# Patient Record
Sex: FEMALE | Race: ASIAN | URBAN - METROPOLITAN AREA
[De-identification: names, ages, dates, MRNs, and addresses within clinical notes are randomized per-mention and may not be internally consistent; named-entity substitution may affect disease eponyms.]

---

## 2017-01-06 ENCOUNTER — OFFICE VISIT (OUTPATIENT)
Dept: OBGYN | Facility: CLINIC | Age: 32
End: 2017-01-06
Attending: ADVANCED PRACTICE MIDWIFE
Payer: COMMERCIAL

## 2017-01-06 VITALS
BODY MASS INDEX: 25.56 KG/M2 | DIASTOLIC BLOOD PRESSURE: 67 MMHG | SYSTOLIC BLOOD PRESSURE: 112 MMHG | HEIGHT: 64 IN | WEIGHT: 149.7 LBS

## 2017-01-06 DIAGNOSIS — M79.89 LEFT AXILLARY SWELLING: ICD-10-CM

## 2017-01-06 DIAGNOSIS — O26.03 EXCESSIVE WEIGHT GAIN IN PREGNANCY IN THIRD TRIMESTER: ICD-10-CM

## 2017-01-06 DIAGNOSIS — O99.613 CONSTIPATION DURING PREGNANCY IN THIRD TRIMESTER: Primary | ICD-10-CM

## 2017-01-06 DIAGNOSIS — K59.00 CONSTIPATION DURING PREGNANCY IN THIRD TRIMESTER: Primary | ICD-10-CM

## 2017-01-06 PROCEDURE — 99211 OFF/OP EST MAY X REQ PHY/QHP: CPT | Mod: ZF

## 2017-01-06 RX ORDER — ASPIRIN 81 MG
100 TABLET, DELAYED RELEASE (ENTERIC COATED) ORAL 2 TIMES DAILY
Qty: 100 TABLET | Refills: 1 | Status: ON HOLD | OUTPATIENT
Start: 2017-01-06 | End: 2017-01-17

## 2017-01-06 RX ORDER — ASPIRIN 81 MG
100 TABLET, DELAYED RELEASE (ENTERIC COATED) ORAL DAILY
Qty: 60 TABLET | Refills: 1 | Status: SHIPPED | OUTPATIENT
Start: 2017-01-06 | End: 2017-01-06

## 2017-01-06 ASSESSMENT — PAIN SCALES - GENERAL: PAINLEVEL: NO PAIN (0)

## 2017-01-06 NOTE — NURSING NOTE
Chief Complaint   Patient presents with     Prenatal Care   34.5 weeks ob visit  Some leg cramps  occ  Contractions.

## 2017-01-06 NOTE — PROGRESS NOTES
"Subjective:      31 year old  at 34w5d presents for a routine prenatal appointment with FOB and .         No vaginal bleeding,  leakage of fluid, or change in vaginal discharge.  No contractions.  + fetal movement.     No HA, visual changes, RUQ or epigastric pain.   Patient concerns: Feeling well overall.     - Breastpump questions - unsure about which brand to get.    - Now having BL carpal tunnel tingling - resolves after a few minutes. Has not tried splints as recommended.  - Having constipation - eating a banana a day, drinking  1-2 cups of milk a day. Has tried to limit rice and wheat.   Not taking stool softener.  Having some pain with stools when tough - occasional rectal bleeding.  Not eating much dietary fiber.  Hasn't tried metamucil or miralax.    - L axillary feels much softer to pt, NT, no redness.   - Has some leg cramps at times.  Worried about calcium.     Objective:  Filed Vitals:    17 1110   BP: 112/67   Height: 1.613 m (5' 3.5\")   Weight: 67.903 kg (149 lb 11.2 oz)    See OB flowsheet    Extremeties -  Left Axilla NT, no redness or swelling.   Left Axilla - no redness or swelling.     Assessment/Plan     Encounter Diagnoses   Name Primary?     Constipation during pregnancy in third trimester Yes     Left axillary swelling      Excessive weight gain in pregnancy in third trimester      No orders of the defined types were placed in this encounter.     Orders Placed This Encounter   Medications     DISCONTD: docusate sodium (COLACE) 100 MG tablet     Sig: Take 100 mg by mouth daily     Dispense:  60 tablet     Refill:  1     docusate sodium (COLACE) 100 MG tablet     Sig: Take 100 mg by mouth 2 times daily     Dispense:  100 tablet     Refill:  1       PHQ-9 SCORE 2016   Total Score 0 4     PHQ-9 SCORE 2016   Total Score 0 4       - Will contact insurance directly if [facility] pharmacy unable to help fufill breastpump RX.    - Again declined " RX for wrist splints.  Encouraged to get OTC and use BL at night every night and during day prn.   -  Reivewed dietary changes - increase PO water, avoid constipating foods, increase dietary fiber/metamucille(sugar free), daily exercise.  Colace RX BID given. Miralax prn reviewed.   - Reviewed calf stretches before bed, increase water, warning s/s and how/why to contact provider prn.   - Labor signs discussed. Reinforced daily fetal movement counts.  - Reviewed why/how to contact provider if headache/visual changes/RUQ or epigastric pain, decreased fetal movement, vaginal bleeding, leakage of fluid.  - Return to clinic in 1 week and prn if questions or concerns.     KILLIAN Ochoa CNM

## 2017-01-06 NOTE — Clinical Note
"2017       RE: Susanna Gleason  [address] [address]     Dear Colleague,    Thank you for referring your patient, Susanna Gleason, to the [facility] at [facility]. Please see a copy of my visit note below.    Subjective:      31 year old  at 34w5d presents for a routine prenatal appointment with FOB and .         No vaginal bleeding,  leakage of fluid, or change in vaginal discharge.  No contractions.  + fetal movement.     No HA, visual changes, RUQ or epigastric pain.   Patient concerns: Feeling well overall.     - Breastpump questions - unsure about which brand to get.    - Now having BL carpal tunnel tingling - resolves after a few minutes. Has not tried splints as recommended.  - Having constipation - eating a banana a day, drinking  1-2 cups of milk a day. Has tried to limit rice and wheat.   Not taking stool softener.  Having some pain with stools when tough - occasional rectal bleeding.  Not eating much dietary fiber.  Hasn't tried metamucil or miralax.    - L axillary feels much softer to pt, NT, no redness.   - Has some leg cramps at times.  Worried about calcium.     Objective:  Filed Vitals:    17 1110   BP: 112/67   Height: 1.613 m (5' 3.5\")   Weight: 67.903 kg (149 lb 11.2 oz)    See OB flowsheet    Extremeties -  Left Axilla NT, no redness or swelling.   Left Axilla - no redness or swelling.     Assessment/Plan     Encounter Diagnoses   Name Primary?     Constipation during pregnancy in third trimester Yes     Left axillary swelling      Excessive weight gain in pregnancy in third trimester      No orders of the defined types were placed in this encounter.     Orders Placed This Encounter   Medications     DISCONTD: docusate sodium (COLACE) 100 MG tablet     Sig: Take 100 mg by mouth daily     Dispense:  60 tablet     Refill:  1     docusate sodium (COLACE) 100 MG tablet     Sig: Take 100 mg by mouth 2 times daily "     Dispense:  100 tablet     Refill:  1       PHQ-9 SCORE 8/4/2016 11/2/2016   Total Score 0 4     PHQ-9 SCORE 8/4/2016 11/2/2016   Total Score 0 4       - Will contact insurance directly if [facility] pharmacy unable to help fufill breastpump RX.    - Again declined RX for wrist splints.  Encouraged to get OTC and use BL at night every night and during day prn.   -  Reivewed dietary changes - increase PO water, avoid constipating foods, increase dietary fiber/metamucille(sugar free), daily exercise.  Colace RX BID given. Miralax prn reviewed.   - Reviewed calf stretches before bed, increase water, warning s/s and how/why to contact provider prn.   - Labor signs discussed. Reinforced daily fetal movement counts.  - Reviewed why/how to contact provider if headache/visual changes/RUQ or epigastric pain, decreased fetal movement, vaginal bleeding, leakage of fluid.  - Return to clinic in 1 week and prn if questions or concerns.     KILLIAN Ochoa CNM

## 2017-01-09 PROBLEM — K59.00 CONSTIPATION DURING PREGNANCY IN THIRD TRIMESTER: Status: ACTIVE | Noted: 2017-01-09

## 2017-01-09 PROBLEM — O99.613 CONSTIPATION DURING PREGNANCY IN THIRD TRIMESTER: Status: ACTIVE | Noted: 2017-01-09

## 2017-01-17 ENCOUNTER — HOSPITAL ENCOUNTER (OUTPATIENT)
Facility: CLINIC | Age: 32
End: 2017-01-17
Attending: ADVANCED PRACTICE MIDWIFE | Admitting: ADVANCED PRACTICE MIDWIFE
Payer: COMMERCIAL

## 2017-01-17 ENCOUNTER — HOSPITAL ENCOUNTER (OUTPATIENT)
Facility: CLINIC | Age: 32
Discharge: HOME | End: 2017-01-17
Attending: ADVANCED PRACTICE MIDWIFE | Admitting: ADVANCED PRACTICE MIDWIFE
Payer: COMMERCIAL

## 2017-01-17 ENCOUNTER — TRANSFERRED RECORDS (OUTPATIENT)
Dept: OBGYN | Facility: CLINIC | Age: 32
End: 2017-01-17

## 2017-01-17 ENCOUNTER — OFFICE VISIT (OUTPATIENT)
Dept: INTERPRETER SERVICES | Facility: CLINIC | Age: 32
End: 2017-01-17

## 2017-01-17 ENCOUNTER — TELEPHONE (OUTPATIENT)
Dept: OBGYN | Facility: CLINIC | Age: 32
End: 2017-01-17

## 2017-01-17 VITALS
BODY MASS INDEX: 25.61 KG/M2 | DIASTOLIC BLOOD PRESSURE: 67 MMHG | TEMPERATURE: 98.4 F | SYSTOLIC BLOOD PRESSURE: 108 MMHG | HEIGHT: 64 IN | WEIGHT: 150 LBS
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
## 2017-01-17 LAB
ALBUMIN UR-MCNC: NEGATIVE MG/DL
APPEARANCE UR: CLEAR
APTT PPP: 30 SEC (ref 22–37)
BACTERIA #/AREA URNS HPF: ABNORMAL /HPF
BASOPHILS # BLD AUTO: 0 10E9/L (ref 0–0.2)
BASOPHILS NFR BLD AUTO: 0 %
BILIRUB UR QL STRIP: NEGATIVE
COLOR UR AUTO: ABNORMAL
DIFFERENTIAL METHOD BLD: ABNORMAL
EOSINOPHIL # BLD AUTO: 0 10E9/L (ref 0–0.7)
EOSINOPHIL NFR BLD AUTO: 0.1 %
ERYTHROCYTE [DISTWIDTH] IN BLOOD BY AUTOMATED COUNT: 13.7 % (ref 10–15)
FETAL HGB STAIN: NORMAL
FIBRINOGEN PPP-MCNC: 408 MG/DL (ref 200–420)
GLUCOSE UR STRIP-MCNC: 30 MG/DL
HCT VFR BLD AUTO: 35.4 % (ref 35–47)
HGB BLD-MCNC: 11.9 G/DL (ref 11.7–15.7)
HGB F BLD QL KLEIH BETKE: NORMAL
HGB UR QL STRIP: NEGATIVE
IMM GRANULOCYTES # BLD: 0.1 10E9/L (ref 0–0.4)
IMM GRANULOCYTES NFR BLD: 1 %
INR PPP: 0.96 (ref 0.86–1.14)
KETONES UR STRIP-MCNC: NEGATIVE MG/DL
LEUKOCYTE ESTERASE UR QL STRIP: ABNORMAL
LYMPHOCYTES # BLD AUTO: 0.9 10E9/L (ref 0.8–5.3)
LYMPHOCYTES NFR BLD AUTO: 10.1 %
MCH RBC QN AUTO: 35 PG (ref 26.5–33)
MCHC RBC AUTO-ENTMCNC: 33.6 G/DL (ref 31.5–36.5)
MCV RBC AUTO: 104 FL (ref 78–100)
MICRO REPORT STATUS: NORMAL
MONOCYTES # BLD AUTO: 0.4 10E9/L (ref 0–1.3)
MONOCYTES NFR BLD AUTO: 4.5 %
NEUTROPHILS # BLD AUTO: 7.7 10E9/L (ref 1.6–8.3)
NEUTROPHILS NFR BLD AUTO: 84.3 %
NITRATE UR QL: NEGATIVE
NRBC # BLD AUTO: 0 10*3/UL
NRBC BLD AUTO-RTO: 0 /100
PH UR STRIP: 6 PH (ref 5–7)
PLATELET # BLD AUTO: 182 10E9/L (ref 150–450)
RBC # BLD AUTO: 3.4 10E12/L (ref 3.8–5.2)
RBC #/AREA URNS AUTO: <1 /HPF (ref 0–2)
SP GR UR STRIP: 1.01 (ref 1–1.03)
SPECIMEN SOURCE: NORMAL
SQUAMOUS #/AREA URNS AUTO: 2 /HPF (ref 0–1)
URN SPEC COLLECT METH UR: ABNORMAL
UROBILINOGEN UR STRIP-MCNC: NORMAL MG/DL (ref 0–2)
WBC # BLD AUTO: 9.1 10E9/L (ref 4–11)
WBC #/AREA URNS AUTO: 2 /HPF (ref 0–2)
WET PREP SPEC: NORMAL

## 2017-01-17 PROCEDURE — 59025 FETAL NON-STRESS TEST: CPT

## 2017-01-17 PROCEDURE — 87653 STREP B DNA AMP PROBE: CPT | Performed by: ADVANCED PRACTICE MIDWIFE

## 2017-01-17 PROCEDURE — 99214 OFFICE O/P EST MOD 30 MIN: CPT | Mod: 25

## 2017-01-17 PROCEDURE — 85025 COMPLETE CBC W/AUTO DIFF WBC: CPT | Performed by: ADVANCED PRACTICE MIDWIFE

## 2017-01-17 PROCEDURE — T1013 SIGN LANG/ORAL INTERPRETER: HCPCS | Mod: U3

## 2017-01-17 PROCEDURE — 36415 COLL VENOUS BLD VENIPUNCTURE: CPT | Performed by: ADVANCED PRACTICE MIDWIFE

## 2017-01-17 PROCEDURE — 85460 HEMOGLOBIN FETAL: CPT | Performed by: ADVANCED PRACTICE MIDWIFE

## 2017-01-17 PROCEDURE — 81001 URINALYSIS AUTO W/SCOPE: CPT | Performed by: ADVANCED PRACTICE MIDWIFE

## 2017-01-17 PROCEDURE — 85384 FIBRINOGEN ACTIVITY: CPT | Performed by: ADVANCED PRACTICE MIDWIFE

## 2017-01-17 PROCEDURE — 87210 SMEAR WET MOUNT SALINE/INK: CPT | Performed by: ADVANCED PRACTICE MIDWIFE

## 2017-01-17 PROCEDURE — 85730 THROMBOPLASTIN TIME PARTIAL: CPT | Performed by: ADVANCED PRACTICE MIDWIFE

## 2017-01-17 PROCEDURE — 85610 PROTHROMBIN TIME: CPT | Performed by: ADVANCED PRACTICE MIDWIFE

## 2017-01-17 RX ORDER — ACETAMINOPHEN 325 MG/1
650-975 TABLET ORAL EVERY 4 HOURS PRN
Status: DISCONTINUED | OUTPATIENT
Start: 2017-01-17 | End: 2017-01-17 | Stop reason: HOSPADM

## 2017-01-17 NOTE — IP AVS SNAPSHOT
[id number]    [address] [place] [address]    Phone:  [phone]                                       After Visit Summary   1/17/2017    Susanna Gleason    MRN: [id number]           After Visit Summary Signature Page     I have received my discharge instructions, and my questions have been answered. I have discussed any challenges I see with this plan with the nurse or doctor.    ..........................................................................................................................................  Patient/Patient Representative Signature      ..........................................................................................................................................  Patient Representative Print Name and Relationship to Patient    ..................................................               ................................................  Date                                            Time    ..........................................................................................................................................  Reviewed by Signature/Title    ...................................................              ..............................................  Date                                                            Time

## 2017-01-17 NOTE — PROGRESS NOTES
D/C teaching done with pt. And spouse with .  All questions and concerns addressed, they verbalize understanding.

## 2017-01-17 NOTE — TELEPHONE ENCOUNTER
Received call from Susanna stating last evening she fell on her back but felt fine afterwards. This morning she has sharp pain in her low abdomen that is constant. Denies bleeding and leaking fluid. Positive fetal movement. She sounded distressed on the phone.    [facility] and she agreed with plan.    Called [facility] and gave above information to Toshia. Pt sees the midwives.

## 2017-01-17 NOTE — PLAN OF CARE
Pt. Presents to L&D for contactions that started at 0730 this morning.  She stats they feel like sharp cramping.  She also states that she fell last night going to do laundry and walking outside.  She slipped and fell onto her left hip then her back.  She denies bruising or pain.  She denies LOF or bleeding and + FM since the fall/  [place] and US placed.  Dacia Membreno CNM at bedside to consult with pt.

## 2017-01-17 NOTE — DISCHARGE INSTRUCTIONS
Discharge Instruction for Undelivered Patients      You were seen for: a fall and labor assessment  We Consulted: Dacia PHILLIPS CNM  You had (Test or Medicine):fetal and uterine monitoring, urine and blood test, GBS test     Diet:   Drink 8 to 12 glasses of liquids (milk, juice, water) every day.  You may eat meals and snacks.     Activity:  Count fetal kicks everyday (see handout)  Call your doctor or nurse midwife if your baby is moving less than usual.     Call your provider if you notice:  Swelling in your face or increased swelling in your hands or legs.  Headaches that are not relieved by Tylenol (acetaminophen).  Changes in your vision (blurring: seeing spots or stars.)  Nausea (sick to your stomach) and vomiting (throwing up).   Weight gain of 5 pounds or more per week.  Heartburn that doesn't go away.  Signs of bladder infection: pain when you urinate (use the toilet), need to go more often and more urgently.  The bag of olivia (rupture of membranes) breaks, or you notice leaking in your underwear.  Bright red blood in your underwear.  Abdominal (lower belly) or stomach pain.  For first baby: Contractions (tightening) less than 5 minutes apart for one hour or more.  Increase or change in vaginal discharge (note the color and amount)  Other: drink 3 pitchers of water daily.  Sen[place]ot-S 1pill once or twice daily for constipation    Follow-up:  As scheduled in the clinic Friday Jan. 20, 2017

## 2017-01-17 NOTE — PROGRESS NOTES
"HOSPITAL TRIAGE NOTE  ===================    CHIEF COMPLAINT  ========================  Susanna Gleason is a 31 year old patient presenting today at 36w2d for evaluation s/p fall, irregular contractions.    Patient's last menstrual period was 2016 (exact date).  Estimated Date of Delivery: 2017     HPI  ==================   Pt is a 31 y.o.  @ 36+2 by LMP, confirmed by 1st trimester u/s. Presented to triage reporting that she fell last night at 2030. Patient states she was walking outside to the laundry room and slipped on the ice. She states she fell on her left hip and her back; did not hit her abdomen. Reports that she felt \"fine\" after falling and had no pain or concerns. She did not notify the clinic or BP. She states she did not plan to come in for evaluation, however, upon waking up this morning, she started to feel irregular lower abdominal cramping. Describes as similar to menstrual cramping. States that initially it was constant and painful. Now reports that she has only felt this cramping 1-2 times. Denies vaginal bleeding, discharge or loss of fluid. Reports +fetal movement.     Prenatal record and labs reviewed from [facility], through [employer] EMR.  CONTRACTIONS: irregular, cramping  ABDOMINAL PAIN: none  FETAL MOVEMENT: active    VAGINAL BLEEDING: none  RUPTURE OF MEMBRANES: no  PELVIC PAIN: none    PREGNANCY COMPLICATIONS: none  OTHER: PMH significant for migraines, perioral dermatitis and bilateral carpal tunnel in pregnancy    REVIEW OF SYSTEMS  =====================  C: NEGATIVE for fever, chills  I: NEGATIVE for worrisome rashes, moles or lesions  E: NEGATIVE for vision changes or irritation  R: NEGATIVE for significant cough or SOB  CV: NEGATIVE for chest pain, palpitations or varicosities  GI: NEGATIVE for nausea, abdominal pain, heartburn, or change in bowel habits  : NEGATIVE for frequency, dysuria, or hematuria  M: NEGATIVE for significant arthralgias or " myalgia  N: NEGATIVE for headache, weakness, dizziness or paresthesias  P: NEGATIVE for changes in mood or affect    PROBLEM LIST  ===============  Patient Active Problem List    Diagnosis Date Noted     Fall 01/17/2017     Priority: Medium     Constipation during pregnancy in third trimester 01/09/2017     Priority: Medium     1/6/17 - Reivewed dietary changes - increase PO water, avoid constipating foods, increase dietary fiber/metamucille(sugar free), daily exercise.  Colace RX BID given. Miralax prn reviewed.        Carpal tunnel syndrome during pregnancy 12/20/2016     Priority: Medium     12/20/2016 - Declined RX, will get OTC wrist splint.       Excessive weight gain in pregnancy in third trimester 12/20/2016     Priority: Medium     1/9/2017 - No food journal to review.  Is eating bananas daily, milk daily.  Encouraged again to review carbohydrate intake, increase water, increase physical activity.   12/20/2016 - Reviewed importance of balanced, protein rich diet in pregnancy. Reviewed at length avoidance of simple carbohydrates in pregnancy (limit fruit, milk, pasta, etc). Encouraged food journal, bring to next visit to [place]. Pt may send via my chart before next visit for earlier review         Left axillary swelling 12/20/2016     Priority: Medium     1/6/17 - Swelling no longer present.  NT, no redness or mass/lump/bump.  Continue surveillance.  12/20/2016 - L lymph node noted swollen but NT, no s/s of infection. Possible swelling of breast tissue.   RTO in 2 weeks and prn if symptoms worsen. Consider US prn.          Perioral dermatitis 10/26/2016     Priority: Medium     Class: Acute     Migraine without aura 09/02/2016     Priority: Medium     Class: Chronic     9/1/16: Reports history of migraine headaches prior to pregnancy, approximately 4-5x/year; Reoccurring since pregnancy, not currently taking medication   To schedule appointment with Dr. Sumner _____       Vitamin D deficiency 07/13/2016      Priority: Medium     Taking supplementation       , CNM Pt, STEPHANI  2016     Priority: Medium     Class: Acute     2016 - EOB done with .  Declined water birth.  EOB for next appt as had more questions.   : 9wk1 day, only sm subchorionic hemorrhage  16- US- 7 6/7wks, moderate subchorionic hemorrhage.             HISTORIES  ==============  ALLERGIES:    No Known Allergies  PAST MEDICAL HISTORY  Past Medical History   Diagnosis Date     NO ACTIVE PROBLEMS      SOCIAL HISTORY  Social History     Social History     Marital Status:      Spouse Name: Nevaeh Benitez     Number of Children: N/A     Years of Education: N/A     Occupational History     stay at home       post-doc, biomed engineering     Social History Main Topics     Smoking status: Never Smoker      Smokeless tobacco: Not on file     Alcohol Use: No     Drug Use: No     Sexual Activity: Not on file     Other Topics Concern     Not on file     Social History Narrative    How much exercise per week? walks    How much calcium per day? diet       How much caffeine per day? none    How much vitamin D per day? prenatal    Do you/your family wear seatbelts?  Yes    Do you/your family use safety helmets? No    Do you/your family use sunscreen? Yes    Do you/your family keep firearms in the home? No    Do you/your family have a smoke detector(s)? Yes        Do you feel safe in your home? Yes    Has anyone ever touched you in an unwanted manner? No     Katelyn Deleon-Tilley, CMA 16         PARTNER:  supportive at bedside    FAMILY HISTORY  Family History   Problem Relation Age of Onset     Family History Negative       Hypertension Maternal Grandmother 60     OB HISTORY  Obstetric History       T0      TAB0   SAB0   E0   M0   L0       # Outcome Date GA Lbr [place]/2nd Weight Sex Delivery Anes PTL Lv   1 Current                 Prenatal Labs:   Lab Results   Component Value Date    ABO O  "2016    RH  Pos 2016    AS Neg 2016    HEPBANG Nonreactive 2016    TREPAB Negative 2016    HGB 11.9 2017     Rubella- Immune    EXAM  ============  /67 mmHg  Temp(Src) 98.4  F (36.9  C) (Oral)  Ht 1.613 m (5' 3.5\")  Wt 68.04 kg (150 lb)  BMI 26.15 kg/m2  LMP 2016 (Exact Date)  GENERAL APPEARANCE: healthy, alert and no distress  RESP: lungs clear to auscultation - no rales, rhonchi or wheezes  BREAST: normal without masses, tenderness or nipple discharge and no palpable axillary masses or adenopathy  CV: regular rates and rhythm, normal S1 S2, no S3 or S4 and no murmur,and no varicosities  ABDOMEN:  soft, nontender, no epigastric pain  SKIN: no suspicious lesions or rashes  NEURO: Denies headache, blurred vision, other vision changes  PSYCH: mentation appears normal. and affect normal/bright  MS/ LEGS: Reflexes normal bilaterally    CONTRACTIONS: Irritability noted as well as irregular contractions q10-20 minutes; most not felt by patient, non palpable  FETAL HEART TONES: continuous EFM x 4 hours- baseline 140 with moderate variability and positive accelerations. No decelerations.  NST: REACTIVE      PELVIC EXAM: Initial exam at 1200: Manual exam: Closed/ 20%/ -3, posterior/firm    Repeat exam at 1430: Manual exam: Unchanged, closed/20%/-3, posterior/firm  PRESENTATION: VERTEX confirmed by sve, leopolds and bedside ultrasound  SSE: No bleeding noted, white discharge noted (wet prep sent), Cervix visually appears closed  BLOOD: no  DISCHARGE: white, thick    ROM: no  AMNISURE: not done    LABS: UA, Wet prep, GBS collected   Abruption labs ordered: Abo/Rh T&S, CBC with platelets, INR, PT, Fibrinogen, Kleihauer betke  Lab results reviewed- All labs WNL, GBS pending, Kleihauer betke pending    DIAGNOSIS  ============  36w2d, evaluation s/p fall, r/o  labor  Abruption labs WNL, KB pending  No cervical change in >2 hours  Exam WNL  Category One fetal tracing x 4 " hours  NST: REACTIVE      PLAN  ============  Reviewed reassuring fetal tracing, abruption labs and exam with patient and FOB.  Emphasized  labor precautions, fetal movement counts and s/s to call/present to triage, including vaginal bleeding, discharge, lof, pain, contractions or decreased in fetal movement.  Pauly Alcantar pending, will follow up with patient re: results as indicated.  GBS pending.   Continue scheduled prenatal care, Next appt scheduled for 1/10/17.  Pt and  verbalized understanding and agreed with plan of care.  Discharged to home in stable condition.        KILLIAN Thomas CNM     Fetal Non-Stress Test Results    NST Ordered By: KILLIAN Thomas CNM       NST Start & Stop Times  NST Start Time: 1000  NST Stop Time: 1020       NST Results  Fetus A   Baseline Rate:140  Accelerations: Present  Decelerations: None  Interpretation: reactive

## 2017-01-17 NOTE — IP AVS SNAPSHOT
MRN:[id number]                      After Visit Summary   1/17/2017    Susanna Gleason    MRN: [id number]           Thank you!     Thank you for choosing [facility] for your care. Our goal is always to provide you with excellent care. Hearing back from our patients is one way we can continue to improve our services. Please take a few minutes to complete the written survey that you may receive in the mail after you visit with us. Thank you!        Patient Information     Date Of Birth          1985        About your hospital stay     You were admitted on:  January 17, 2017 You last received care in the:  [facility]    You were discharged on:  January 17, 2017       Who to Call     For medical emergencies, please call 911.  For non-urgent questions about your medical care, please call your primary care provider or clinic, None          Attending Provider     Provider    Rocío Ortiz CNM       Primary Care Provider    Physician No Ref-Primary       No address on file        Your next 10 appointments already scheduled     Jan 20, 2017 10:30 AM   RETURN OB with KILLIAN Rainey CNM, Abrahan Richards   [facility] ([facility])    [facility] [address]  [address] [facility] [id number] [phone]              Further instructions from your care team       Discharge Instruction for Undelivered Patients      You were seen for: a fall and labor assessment  We Consulted: Dacia PHILLIPS CNM  You had (Test or Medicine):fetal and uterine monitoring, urine and blood test, GBS test     Diet:   Drink 8 to 12 glasses of liquids (milk, juice, water) every day.  You may eat meals and snacks.     Activity:  Count fetal kicks everyday (see handout)  Call your doctor or nurse midwife if your baby is moving less than usual.     Call your provider if you notice:  Swelling in your face or increased swelling in your hands or legs.  Headaches  "that are not relieved by Tylenol (acetaminophen).  Changes in your vision (blurring: seeing spots or stars.)  Nausea (sick to your stomach) and vomiting (throwing up).   Weight gain of 5 pounds or more per week.  Heartburn that doesn't go away.  Signs of bladder infection: pain when you urinate (use the toilet), need to go more often and more urgently.  The bag of olivia (rupture of membranes) breaks, or you notice leaking in your underwear.  Bright red blood in your underwear.  Abdominal (lower belly) or stomach pain.  For first baby: Contractions (tightening) less than 5 minutes apart for one hour or more.  Increase or change in vaginal discharge (note the color and amount)  Other: drink 3 pitchers of water daily.  Sennekot-S 1pill once or twice daily for constipation    Follow-up:  As scheduled in the clinic Friday Jan. 20, 2017          Pending Results     Date and Time Order Name Status Description    1/17/2017 1015 Fetal hemoglobin stain In process     1/17/2017 1001 Group B strep PCR In process             Admission Information        Provider Department Dept Phone    1/17/2017 Rocío Ortiz CNM [facility] [phone]      Your Vitals Were     Blood Pressure Temperature    108/67 mmHg 98.4  F (36.9  C) (Oral)    Height Weight    1.613 m (5' 3.5\") 68.04 kg (150 lb)    BMI (Body Mass Index) Last Period    26.15 kg/m2 05/08/2016 (Exact Date)      [facility] Information     [facility] gives you secure access to your electronic health record. If you see a primary care provider, you can also send messages to your care team and make appointments. If you have questions, please call your primary care clinic.  If you do not have a primary care provider, please call [phone] and they will assist you.        Care EveryWhere ID     This is your Care EveryWhere ID. This could be used by other organizations to access your [facility] medical records  [id number]           Review of your medicines      UNREVIEWED medicines. " Ask your doctor about these medicines        Dose / Directions    prenatal multivitamin  plus iron 27-0.8 MG Tabs per tablet        Dose:  1 tablet   Take 1 tablet by mouth daily   Refills:  0       vitamin D 2000 UNITS tablet   Used for:  Vitamin D deficiency, Encounter for supervision of normal first pregnancy in second trimester        Dose:  2 tablet   Take 2 tablets by mouth daily Take two tablets daily.   Quantity:  180 tablet   Refills:  3         CONTINUE these medicines which have NOT CHANGED        Dose / Directions    breast pump Misc   Used for:  Lactation disorder        Dose:  1 each   1 each daily as needed   Quantity:  1 each   Refills:  0                Protect others around you: Learn how to safely use, store and throw away your medicines at www.disposemymeds.org.             Medication List: This is a list of all your medications and when to take them. Check marks below indicate your daily home schedule. Keep this list as a reference.      Medications           Morning Afternoon Evening Bedtime As Needed    breast pump Misc   1 each daily as needed                                prenatal multivitamin  plus iron 27-0.8 MG Tabs per tablet   Take 1 tablet by mouth daily                                vitamin D 2000 UNITS tablet   Take 2 tablets by mouth daily Take two tablets daily.                                          More Information        Kick Counts  It s normal to worry about your baby s health. One way you can know your baby s doing well is to record the baby s movements once a day. This is called a kick count. You will usually feel your baby move by the 20th week of pregnancy. Remember to take your kick count records to all your appointments with your healthcare provider.       How to Count Kicks    Choose a time when the baby is active, such as after a meal.     Sit comfortably or lie on your side.     The first time the baby moves, write down the time.     Count each movement until the  baby has moved 10 times. This can take from 20 minutes to 2 hours.     If the baby hasn t moved 4 times in 1 hour, pat your stomach to wake the baby up.    Write down the time you feel the baby s 10th movement.    Try to do it at the same time each day.  When to Call Your Healthcare Provider  Call your healthcare provider right away if you notice any of the following:    Your baby moves fewer than 10 times in 2 hours while you re doing kick counts.    Your baby moves much less often than on the days before.    You have not felt your baby move all day.    1845-7046 [facility], [address] [place] [address]. All rights reserved. This information is not intended as a substitute for professional medical care. Always follow your healthcare professional's instructions.            Vaginal Birth: Your Experience  You re almost ready for the big event -- your baby s birth. Once your cervix becomes fully dilated, you can begin pushing. At this point you may have a burst of energy. The delivery itself may take a few contractions or a few hours. If your baby needs help getting out of the birth canal, your healthcare provider can assist.    Getting ready to push  The shortest but most intense part of labor is transition. This is when the cervix becomes fully dilated. Contractions may become even stronger. They may last 60 to 90 seconds, with almost no rest in between. This is a demanding time. You might experience hot flashes, chills, nausea, vomiting, or gas. IV (intravenous) pain medicines are also rarely given so close to your baby s birth. However, epidurals are continued during birth. Help yourself by working with your support person or labor . You may feel an urge to push or bear down. But do not push until your healthcare provider or midwife tells you to.  Pushing toward birth  After your baby s head enters the birth canal, contractions may come less often. Pushing down with the contraction helps  move your baby further into the birth canal. If you ve had a  in the past, your labor will be managed to help prevent tearing the scar.     Once the head and shoulders appear, your baby is ready to be born.   Your baby s birth  Once your baby s head passes under the pubic symphysis, your perineum starts to stretch and bulge. Soon, the top of your baby s head crowns (appears at the vaginal opening). You may have a burning feeling as this happens. Your healthcare provider or midwife may tell you to pant. This is so you won t push too hard and tear your perineum as the baby s head and shoulders come through. (A small amount of tearing is not rare and is not a problem.) Your baby is born soon after the shoulders leave the birth canal. The umbilical cord is then cut.   Assisted delivery  Your baby may need extra help getting out of the birth canal. If so:    An episiotomy (a small incision in the perineum) may be made. This enlarges the vaginal opening and helps prevent tearing. A local anesthetic may be used to numb the area. After your baby is born, the incision is stitched closed with sutures.These are usually dissolvable.    Forceps (spoon-shaped instruments that cup the baby s head) may be used to help your baby s head through the birth canal.    Vacuum extraction, which uses a small suction cup attached to the baby s head, may be used to assist the birth.  After your baby s birth  After the baby is born, the placenta is delivered. Mild contractions separate it from the uterus and move it into the vagina. Then you push it out. Your doctor or midwife may press on the uterus to expel blood clots. If you had an epidural anesthetic, the catheter is removed.     Support person s note    Help the mother into a pushing position. Support her body as she pushes. A semi-sitting or semi-squatting position allows gravity to assist the birth.    Remind her to rest between contractions.    Encourage her by telling her  when the baby s head appears.    Keep in mind that you may be masked and gowned for the birth, depending on hospital policy.     1399-3920 [employer]. [address] [place] [address], [place] [address]. All rights reserved. This information is not intended as a substitute for professional medical care. Always follow your healthcare professional's instructions.                False Labor    If your pregnancy is at 37 weeks or longer and you are having contractions that are not true labor, you are having false labor. This means that it is not time yet to give birth to your baby.  True labor contractions can start unevenly but soon take on a regular pattern. As time goes on, the contractions will get stronger. Also, the intervals between the contractions will get shorter. Even at the onset, these contractions last at least 30 seconds and may increase to a minute. True labor contractions often start in the back and then move to the front.  False labor contractions can be strong, frequent, and painful, but there is no regular pattern. The intensity can vary from strong to mild to strong again. False labor contractions are most often felt in the front. While true labor contractions don t stop no matter what you are doing, false labor contractions may stop on their own or when you rest or move around.  False labor contractions might make you feel anxious or lose sleep. However, they don t mean that you are sick or that anything is wrong with your baby. You don t need to take any medicine for false labor.  Sometimes, it may be too hard to tell false labor from true labor. In such cases, you may need to have a vaginal exam. This allows your healthcare provider to check for changes in the cervix that only occur with true labor.  Home care    It may help to drink plenty of water and take warm baths. Do what you can ahead of time to prepare for giving birth so you ll have less to worry about later.    Keep a record of  your contractions. Write down what time each one starts and how long it lasts. A stopwatch is helpful. Look for the pattern of regularly spaced out contractions with a gradual increase in the time each one lasts.    Don t be embarrassed about going to the hospital with a  false alarm.  Think of it as good practice for the real thing.    Follow-up care  Follow up with your healthcare provider, or as advised. If you are very worried, confused, can t eat or sleep, or have questions about your health or pregnancy, schedule an appointment with your provider.  When to seek medical advice  Call your healthcare provider right away if any of these occur:    You are fewer than 37 weeks along in your pregnancy and you re having contractions.    You have contractions that are regular, getting longer, stronger, and closer together.    Your water breaks.    You have vaginal bleeding.    You feel a decrease in your baby s movement or any other unusual changes.     You re not sure if you are having false or true labor.       4977-8676 [employer]. [address] [place] [address]. All rights reserved. This information is not intended as a substitute for professional medical care. Always follow your healthcare professional's instructions.                Labor and Childbirth: Your Body Prepares  Labor is the series of uterine contractions that dilate (open) and efface (thin) your cervix for birth. Your due date is a guide to when labor will begin, but babies often come days or weeks before or after due dates. Even so, labor need not take you by surprise. In the last weeks of pregnancy, you or your healthcare provider may notice changes that mean labor is near.     Changes in your body  Physical changes often signal that your baby will soon be born:    Discharge from your vagina may increase and become thicker. You may notice a pink or brownish discharge called the bloody show.    The mucous plug may break down  over a few weeks or all at once. Losing the plug doesn t mean that labor will start right away.    You may feel [place] contractions (false labor). These irregular contractions start to soften and thin the cervix. Many women mistake these contractions for true labor. They may be more noticeable towards the end of the day.    Feeling like the baby has dropped lower. In preparation for birth, the baby's head has settled deep into your pelvis.     6368-0815 [employer]. [address] [place] [address]. All rights reserved. This information is not intended as a substitute for professional medical care. Always follow your healthcare professional's instructions.                Labor and Childbirth: Thinking About a Birth Plan  A birth plan outlines your wishes for labor and birth. It helps your healthcare providers know what you want and expect. But be aware that labor is a series of changing conditions and your birth plan may need to change at the last minute. Work with your healthcare provider to create a plan that leaves room for the unexpected.  Your support team    The team that helps you plan your childbirth may include:    Healthcare provider. He or she gives prenatal care (care during your pregnancy) and delivers your baby.    Certified nurse-midwife. This is a registered nurse or other health care professional trained to care for pregnant women and deliver babies.    Labor nurse. This is a nurse who assists during labor and birth.    Anesthesiologist. This healthcare provider can provide medicine for pain control if you need it.    Support person. This is a person who helps with your emotional and physical comfort during labor. It might be your partner, a family member, or a friend.    Labor  or . This person provides nonmedical advice and support.  Questions to think about  Birth preparation classes can help you think about what to include in your birth plan. When making  your plan, ask yourself:    What type of room will I give birth in?    Do I want to be able to walk around during labor and choose labor positions?    What types of comfort measures do I want? Massage, acupressure, birth balls, or music?    Who do I want for my support people? What will their roles be? Who will be with me in the delivery room?    What are my choices for managing pain during labor and birth? How will medicines for pain affect my baby and my labor?    Do I want continuous fetal monitoring?       What types of medicines and IV fluids will I allow to assist me with labor?    What types of procedures or medicines, (if any) will I allow to speed up the labor process?       What type of care and length of hospital stay will my health plan cover?    What choices would I consider should unexpected circumstances develop?    If I had a  in the past, is  a choice?    Do I want immediate contact with my baby after birth with no separation?    How do I want to feed my baby?  Breastfeeding only, or will I allow some formula?    Do I want to delay any medicines or immunizations right after my baby is born?    7140-0991 [employer]. [address] [place] [address]. All rights reserved. This information is not intended as a substitute for professional medical care. Always follow your healthcare professional's instructions.                Recognizing Labor  The beginning of labor is the beginning of birth. You ll start to feel strong contractions. That s when the muscles of your uterus tighten up to help push your baby out during birth.    Yes, labor has probably started   Signs of labor include:    Your contractions are getting stronger and more painful instead of weaker. You ll probably feel them throughout your whole uterus.    Your contractions are regular (you feel them about every 5 to 10 minutes) and they are getting closer together.    You have pink-colored or blood-streaked  fluid from your vagina.    Your water breaks. It may be a gush or a slow trickle of clear fluid from your vagina.  No, it s probably not real labor   Signs of false labor include:    Your contractions aren t regular or strong.    You feel the contractions only in your lower uterus.    Your contractions go away when you walk or change position.    Your contractions go away after drinking fluids.  When to call your healthcare provider  Call your healothcare provider or clinic right away if you notice any of these signs:    Fluid from your vagina, with or without contractions.    Bleeding heavy enough that you need a sanitary pad.    You don t feel your baby moving as much as before.     NOTE: Contractions are timed by both of these measures:    The length of each contraction from its start to its finish.    How far apart the contractions are -- the time between the start of 1 contraction and the start of the next contraction.     1110-6778 [employer]. [address] [place] [address]. All rights reserved. This information is not intended as a substitute for professional medical care. Always follow your healthcare professional's instructions.                Understanding  Labor  Going into labor before your 37th week of pregnancy is called  labor.  labor can cause your baby to be born too soon early. This can lead to a number of health problems that may affect your baby.     Before labor, the cervix is thick and closed.       In  labor, the cervix begins to efface (thin) and dilate (open).      Symptoms of  labor  If you believe you re having  labor, get medical help right away. Contractions alone don t mean you re in  labor. What matters more are changes in your cervix (the lower end of the uterus). Symptoms of  labor include:    Four or more contractions per hour    Strong contractions    Constant menstrual-like cramping    Low-back  pain    Mucous or bloody vaginal discharge    Bleeding or spotting in the second or third trimester     A pelvic exam can help your doctor find out whether your cervix has thinned and opened.   Evaluating  labor  Your health care provider will try to find out whether you re in  labor or whether you re just having contractions. He or she may watch you for a few hours. The following tests may be done:    Pelvic exam to see if your cervix has effaced (thinned) and dilated (opened)    Uterine activity monitoring to detect contractions    Fetal monitoring to check the health of your baby    Ultrasound to check your baby s size and position    Amniocentesis to check how mature your baby s lungs are  Caring for yourself at home  If you have  contractions, but your cervix is still thick and closed, your doctor may ask you to do the following at home:    Drink plenty of water.    Do fewer activities.    Rest in bed on your side.    Avoid intercourse and nipple stimulation.  When to call your health care provider  Call your health care provider if you notice any of these:    Four or more contractions per hour    Bag of water breaks    Bleeding or spotting   If you need hospital care   labor often requires that you have hospital care and complete bed rest. You may have an IV (intravenous) line to get fluids. You may be given pills or injections to help prevent contractions. Finally, you may receive medication (corticosteroids) that helps your baby s lungs mature more rapidly.  Are you at risk?  Any pregnant woman can have  labor. It may start for no reason. But these risk factors can increase your chances:    Past  labor or past early birth    Smoking, drug, or alcohol use during pregnancy    Multiple fetuses (twins or more)    Problems with the shape of the uterus    Bleeding during the pregnancy  The dangers of  birth  A baby born too soon may have health problems. This is  because the baby didn t have enough time to mature. Some of the risks for your baby include:    Not breastfeeding well    Having immature lungs    Bleeding in the brain    Dying  Reaching term  Your goal is to get as close to term as you can before giving birth. The closer you get to term, the higher your chance of having a healthy baby. Work with your health care provider. Together, you can take steps that may keep you from giving birth too early.    7462-9446 [employer]. [address] [place] [address], [place] [address]. All rights reserved. This information is not intended as a substitute for professional medical care. Always follow your healthcare professional's instructions.

## 2017-01-18 LAB
GP B STREP DNA SPEC QL NAA+PROBE: NORMAL
SPECIMEN SOURCE: NORMAL

## 2017-01-20 ENCOUNTER — OFFICE VISIT (OUTPATIENT)
Dept: OBGYN | Facility: CLINIC | Age: 32
End: 2017-01-20
Attending: ADVANCED PRACTICE MIDWIFE
Payer: COMMERCIAL

## 2017-01-20 VITALS
HEIGHT: 64 IN | SYSTOLIC BLOOD PRESSURE: 106 MMHG | BODY MASS INDEX: 25.52 KG/M2 | DIASTOLIC BLOOD PRESSURE: 62 MMHG | WEIGHT: 149.5 LBS

## 2017-01-20 DIAGNOSIS — Z34.03 ENCOUNTER FOR SUPERVISION OF NORMAL FIRST PREGNANCY IN THIRD TRIMESTER: Primary | ICD-10-CM

## 2017-01-20 DIAGNOSIS — O99.613 CONSTIPATION DURING PREGNANCY IN THIRD TRIMESTER: ICD-10-CM

## 2017-01-20 DIAGNOSIS — E55.9 VITAMIN D DEFICIENCY: ICD-10-CM

## 2017-01-20 DIAGNOSIS — O26.03 EXCESSIVE WEIGHT GAIN IN PREGNANCY IN THIRD TRIMESTER: ICD-10-CM

## 2017-01-20 DIAGNOSIS — O26.899 CARPAL TUNNEL SYNDROME DURING PREGNANCY: ICD-10-CM

## 2017-01-20 DIAGNOSIS — K59.00 CONSTIPATION DURING PREGNANCY IN THIRD TRIMESTER: ICD-10-CM

## 2017-01-20 DIAGNOSIS — G56.00 CARPAL TUNNEL SYNDROME DURING PREGNANCY: ICD-10-CM

## 2017-01-20 PROCEDURE — 99211 OFF/OP EST MAY X REQ PHY/QHP: CPT | Mod: ZF

## 2017-01-20 ASSESSMENT — PAIN SCALES - GENERAL: PAINLEVEL: NO PAIN (0)

## 2017-01-20 NOTE — MR AVS SNAPSHOT
After Visit Summary   1/20/2017    Susanna Gleason    MRN: [id number]           Patient Information     Date Of Birth          1985        Visit Information        Provider Department      1/20/2017 10:30 AM Daria Butts APRN CNM; [employer] [facility]        Today's Diagnoses     Encounter for supervision of normal first pregnancy in third trimester    -  1     Excessive weight gain in pregnancy in third trimester         Constipation during pregnancy in third trimester         Vitamin D deficiency         Carpal tunnel syndrome during pregnancy            Follow-ups after your visit        Follow-up notes from your care team     Discussed this visit Return in about 1 week (around 1/27/2017) for MURIEL.      Your next 10 appointments already scheduled     Jan 27, 2017 10:45 AM   RETURN OB with KILLIAN Rainey CNM   [facility] ([facility])    [facility] [address]  [address]  [facility] [address] [phone]              Who to contact     Please call your clinic at [phone] to:    Ask questions about your health    Make or cancel appointments    Discuss your medicines    Learn about your test results    Speak to your doctor   If you have compliments or concerns about an experience at your clinic, or if you wish to file a complaint, please contact [facility] Patient Relations at [phone] or email us at [email or web address]         Additional Information About Your Visit        MyChart Information     [employer] gives you secure access to your electronic health record. If you see a primary care provider, you can also send messages to your care team and make appointments. If you have questions, please call your primary care clinic.  If you do not have a primary care provider, please call [phone] and they will assist you.      MyChart  "is an electronic gateway that provides easy, online access to your medical records. With [facility], you can request a clinic appointment, read your test results, renew a prescription or communicate with your care team.     To access your existing account, please contact your [facility] or call [phone] for assistance.        Care EveryWhere ID     This is your Care EveryWhere ID. This could be used by other organizations to access your [facility] medical records  [id number]        Your Vitals Were     Height BMI (Body Mass Index) Last Period             1.613 m (5' 3.5\") 26.06 kg/m2 05/08/2016 (Exact Date)          Blood Pressure from Last 3 Encounters:   01/20/17 106/62   01/17/17 108/67   01/06/17 112/67    Weight from Last 3 Encounters:   01/20/17 67.813 kg (149 lb 8 oz)   01/17/17 68.04 kg (150 lb)   01/06/17 67.903 kg (149 lb 11.2 oz)              Today, you had the following     No orders found for display       Primary Care Provider    Physician No Ref-Primary       No address on file        Thank you!     Thank you for choosing [facility]  for your care. Our goal is always to provide you with excellent care. Hearing back from our patients is one way we can continue to improve our services. Please take a few minutes to complete the written survey that you may receive in the mail after your visit with us. Thank you!             Your Updated Medication List - Protect others around you: Learn how to safely use, store and throw away your medicines at www.disposemymeds.org.          This list is accurate as of: 1/20/17 11:38 AM.  Always use your most recent med list.                   Brand Name Dispense Instructions for use    breast pump Misc     1 each    1 each daily as needed       prenatal multivitamin  plus iron 27-0.8 MG Tabs per tablet      Take 1 tablet by mouth daily       vitamin D 2000 UNITS tablet     180 tablet    Take 2 tablets by mouth daily " Take two tablets daily.

## 2017-01-20 NOTE — NURSING NOTE
Chief Complaint   Patient presents with     Prenatal Care   36.5 weeks ob visit feeling well more irregular cramping now.

## 2017-01-20 NOTE — Clinical Note
"2017       RE: Susanna Gleason  [address] [facility] [address]     Dear Colleague,    Thank you for referring your patient, Susanna Gleason, to the [facility] at [facility]. Please see a copy of my visit note below.    Subjective:      31 year old  at 36w5d presents for a routine prenatal appointment with FOB and . no vaginal bleeding,  leakage of fluid, or change in vaginal discharge.  No contractions.  + fetal movement.     No HA, visual changes, RUQ or epigastric pain.     Patient concerns: Feeling well overall.   - Was seen in triage after fall -GBS done at visit, aware it's negative.   - Carpal tunnel - tried splints at night but they were uncomfortable especially miller trying to use bathroom so stopped mars them after one try.  Symptoms resolve~30 minutes after waking up.   - Weight gain in pregnancy - has made dietary changes recommended d/t excessive weight gain.  Had no weight gain this week.        Objective:  Filed Vitals:    17 1053   BP: 106/62   Height: 1.613 m (5' 3.5\")   Weight: 67.813 kg (149 lb 8 oz)    See OB flowsheet    Assessment/Plan     Encounter Diagnoses   Name Primary?     Encounter for supervision of normal first pregnancy in third trimester Yes     Excessive weight gain in pregnancy in third trimester      Constipation during pregnancy in third trimester      Vitamin D deficiency      Carpal tunnel syndrome during pregnancy          PHQ-9 SCORE 2016   Total Score 0 4     PHQ-9 SCORE 2016   Total Score 0 4     -GBS screening: Done in triage, negative  - Birth preferences reviewed: Medicated and Feeding preference - Breast  - Reviewed good fetal growth.  Not abnormal to see pause or loss of maternal weight when dietary modifications made.  Encouraged no restricting intake - just focus on lean protein and vegetables, water.   Continue to track maternal weight and fetal growth.   - " Encouraged to find different type of wrist splint, again offered RX, declined.    -Labor signs discussed. Reinforced daily fetal movement counts.  -Reviewed why/how to contact provider if headache/visual changes/RUQ or epigastric pain, decreased fetal movement, vaginal bleeding, leakage of fluid.  - Return to clinic in 1 week and prn if questions or concerns.     KILLIAN OchoaM

## 2017-01-20 NOTE — PROGRESS NOTES
"Subjective:      31 year old  at 36w5d presents for a routine prenatal appointment with FOB and . no vaginal bleeding,  leakage of fluid, or change in vaginal discharge.  No contractions.  + fetal movement.     No HA, visual changes, RUQ or epigastric pain.     Patient concerns: Feeling well overall.   - Was seen in triage after fall -GBS done at visit, aware it's negative.   - Carpal tunnel - tried splints at night but they were uncomfortable especially miller trying to use bathroom so stopped mars them after one try.  Symptoms resolve~30 minutes after waking up.   - Weight gain in pregnancy - has made dietary changes recommended d/t excessive weight gain.  Had no weight gain this week.        Objective:  Filed Vitals:    17 1053   BP: 106/62   Height: 1.613 m (5' 3.5\")   Weight: 67.813 kg (149 lb 8 oz)    See OB flowsheet    Assessment/Plan     Encounter Diagnoses   Name Primary?     Encounter for supervision of normal first pregnancy in third trimester Yes     Excessive weight gain in pregnancy in third trimester      Constipation during pregnancy in third trimester      Vitamin D deficiency      Carpal tunnel syndrome during pregnancy          PHQ-9 SCORE 2016   Total Score 0 4     PHQ-9 SCORE 2016   Total Score 0 4     -GBS screening: Done in triage, negative  - Birth preferences reviewed: Medicated and Feeding preference - Breast  - Reviewed good fetal growth.  Not abnormal to see pause or loss of maternal weight when dietary modifications made.  Encouraged no restricting intake - just focus on lean protein and vegetables, water.   Continue to track maternal weight and fetal growth.   - Encouraged to find different type of wrist splint, again offered RX, declined.    -Labor signs discussed. Reinforced daily fetal movement counts.  -Reviewed why/how to contact provider if headache/visual changes/RUQ or epigastric pain, decreased fetal movement, vaginal bleeding, " leakage of fluid.  - Return to clinic in 1 week and prn if questions or concerns.     Daria Butts, APRN CNM

## 2017-01-27 ENCOUNTER — OFFICE VISIT (OUTPATIENT)
Dept: OBGYN | Facility: CLINIC | Age: 32
End: 2017-01-27
Attending: ADVANCED PRACTICE MIDWIFE
Payer: COMMERCIAL

## 2017-01-27 VITALS
DIASTOLIC BLOOD PRESSURE: 65 MMHG | SYSTOLIC BLOOD PRESSURE: 99 MMHG | HEIGHT: 64 IN | WEIGHT: 150.7 LBS | BODY MASS INDEX: 25.73 KG/M2

## 2017-01-27 DIAGNOSIS — K59.00 CONSTIPATION DURING PREGNANCY IN THIRD TRIMESTER: ICD-10-CM

## 2017-01-27 DIAGNOSIS — O26.899 CARPAL TUNNEL SYNDROME DURING PREGNANCY: ICD-10-CM

## 2017-01-27 DIAGNOSIS — G56.00 CARPAL TUNNEL SYNDROME DURING PREGNANCY: ICD-10-CM

## 2017-01-27 DIAGNOSIS — O26.03 EXCESSIVE WEIGHT GAIN IN PREGNANCY IN THIRD TRIMESTER: ICD-10-CM

## 2017-01-27 DIAGNOSIS — Z34.03 ENCOUNTER FOR SUPERVISION OF NORMAL FIRST PREGNANCY IN THIRD TRIMESTER: Primary | ICD-10-CM

## 2017-01-27 DIAGNOSIS — O99.613 CONSTIPATION DURING PREGNANCY IN THIRD TRIMESTER: ICD-10-CM

## 2017-01-27 PROCEDURE — 99211 OFF/OP EST MAY X REQ PHY/QHP: CPT | Mod: ZF

## 2017-01-27 PROCEDURE — T1013 SIGN LANG/ORAL INTERPRETER: HCPCS | Mod: U3,ZF

## 2017-01-27 ASSESSMENT — PAIN SCALES - GENERAL: PAINLEVEL: NO PAIN (0)

## 2017-01-27 NOTE — Clinical Note
"2017       RE: Susanna Gleason  [address] [address]     Dear Colleague,    Thank you for referring your patient, Susanna Gleason, to the [facility] at [facility]. Please see a copy of my visit note below.    Subjective:     31 year old  at 37w5d presents for routine prenatal visit.  Denies vaginal bleeding or leakage of fluid.  Frequent BH contractions but no regular, painful contractions.  Positive fetal movement.  No HA, visual changes, RUQ or epigastric pain.       Patient concerns: Feeling well overall  - Exposure to viral illness:  with cold, both got flu shots. He denies fevers, mouth sores, chills, body aches.   - Weight gain in pregnancy - has made dietary changes recommended d/t excessive weight gain.  Had no weight gain this week.  - Carpal tunnel syndrome: Uses braces bilaterally with good results.   - SOB when sleeping on R side. No orthopnea, PND, dyspnea in other positions. It resolves with changes positions.      Objective:  Filed Vitals:    17 1050   BP: 99/65   Height: 1.613 m (5' 3.5\")   Weight: 68.357 kg (150 lb 11.2 oz)    See OB flowsheet  Assessment/Plan     Encounter Diagnoses   Name Primary?     Encounter for supervision of normal first pregnancy in third trimester Yes     Carpal tunnel syndrome during pregnancy      Constipation during pregnancy in third trimester      Excessive weight gain in pregnancy in third trimester          - Reviewed why/how to contact provider if headache/visual changes/RUQ or epigastric pain, decreased fetal movement, vaginal bleeding, leakage of fluid or strong/regular contractions.  - Recommended good hand washing while  sick.     Patient education/orders or handouts today:Sign/symptoms of labor and When to call for labor or other concerns  Return to clinic in 1 week and prn if questions or concerns.     I, MS4, am serving as a scribe to document services personally " "performed by CNM based on the provider's statements to me.\" -  Kelly Velásquez, MS4 on behalf of KILLIAN Poole CNM    The encounter was performed by me and scribed by the SNM. The scribed note accurately reflects my personal services and decisions made by me. KILLIAN Ochoa CNM            "

## 2017-01-27 NOTE — PROGRESS NOTES
"Subjective:     31 year old  at 37w5d presents for routine prenatal visit.  Denies vaginal bleeding or leakage of fluid.  Frequent BH contractions but no regular, painful contractions.  Positive fetal movement.  No HA, visual changes, RUQ or epigastric pain.       Patient concerns: Feeling well overall  - Exposure to viral illness:  with cold, both got flu shots. He denies fevers, mouth sores, chills, body aches.   - Weight gain in pregnancy - has made dietary changes recommended d/t excessive weight gain.  Had no weight gain this week.  - Carpal tunnel syndrome: Uses braces bilaterally with good results.   - SOB when sleeping on R side. No orthopnea, PND, dyspnea in other positions. It resolves with changes positions.      Objective:  Filed Vitals:    17 1050   BP: 99/65   Height: 1.613 m (5' 3.5\")   Weight: 68.357 kg (150 lb 11.2 oz)    See OB flowsheet  Assessment/Plan     Encounter Diagnoses   Name Primary?     Encounter for supervision of normal first pregnancy in third trimester Yes     Carpal tunnel syndrome during pregnancy      Constipation during pregnancy in third trimester      Excessive weight gain in pregnancy in third trimester          - Reviewed why/how to contact provider if headache/visual changes/RUQ or epigastric pain, decreased fetal movement, vaginal bleeding, leakage of fluid or strong/regular contractions.  - Recommended good hand washing while  sick.     Patient education/orders or handouts today:Sign/symptoms of labor and When to call for labor or other concerns  Return to clinic in 1 week and prn if questions or concerns.     I, MS4, am serving as a scribe to document services personally performed by CNM based on the provider's statements to me.\" -  Kelly Velásquez, MS4 on behalf of KILLIAN Poole CNM    The encounter was performed by me and scribed by the SN. The scribed note accurately reflects my personal services and decisions made by me. Daria Butts, " APRN CNM

## 2017-02-03 ENCOUNTER — OFFICE VISIT (OUTPATIENT)
Dept: OBGYN | Facility: CLINIC | Age: 32
End: 2017-02-03
Attending: ADVANCED PRACTICE MIDWIFE
Payer: COMMERCIAL

## 2017-02-03 VITALS
HEIGHT: 64 IN | SYSTOLIC BLOOD PRESSURE: 109 MMHG | BODY MASS INDEX: 25.99 KG/M2 | WEIGHT: 152.2 LBS | DIASTOLIC BLOOD PRESSURE: 60 MMHG

## 2017-02-03 DIAGNOSIS — Z34.03 ENCOUNTER FOR SUPERVISION OF NORMAL FIRST PREGNANCY IN THIRD TRIMESTER: Primary | ICD-10-CM

## 2017-02-03 ASSESSMENT — PAIN SCALES - GENERAL: PAINLEVEL: NO PAIN (0)

## 2017-02-03 NOTE — PROGRESS NOTES
"Subjective:     31 year old  at 38w5d presents for routine prenatal visit.         Patient concerns: Feeling well overall, however, states she is worried because she felt less fetal movement on Tuesday night. States that she was walking to the trash and something scared her. Notes that her heart started racing. She reports that she does not remember feeling baby move the rest of the night; was previously moving a lot during the day. She states that since Tuesday night, she has felt fetal movement and has been able to get her fm counts. She would like to monitor baby because although she is getting her normal movement, she is worried about the decreased movement on Tuesday.  Denies vaginal bleeding, discharge or leakage of fluid.  Reports irregular cramping; less than previously.   No HA, visual changes, RUQ or epigastric pain.     Objective:  Filed Vitals:    17 0925   BP: 109/60   Height: 1.613 m (5' 3.5\")   Weight: 69.037 kg (152 lb 3.2 oz)      See OB flowsheet    NST reactive- baseline 145, moderate variability, +accels, no decels.  Irregular contractions.    Assessment/Plan     Encounter Diagnosis   Name Primary?     Encounter for supervision of normal first pregnancy in third trimester Yes      Patient education/orders or handouts today:  Sign/symptoms of labor, When to call for labor or other concerns and NST    - Reviewed why/how to contact provider if headache/visual changes/RUQ or epigastric pain, decreased fetal movement, vaginal bleeding, leakage of fluid or strong/regular contractions.  NST reactive.   Reviewed labor precautions, fetal movement counts and s/s to call/present to triage.  Need to discuss IOL vs BPP at 41 weeks at next visit.  Continue scheduled prenatal care, RTC in 1 week and prn if questions or concerns.     Rocío Ortiz, KILLIAN, NAYELY       "

## 2017-02-03 NOTE — Clinical Note
"2/3/2017       RE: Susanna Gleason  [address] [facility] [address]     Dear Colleague,    Thank you for referring your patient, Susanna Gleason, to the [facility] at [facility]. Please see a copy of my visit note below.    Subjective:     31 year old  at 38w5d presents for routine prenatal visit.         Patient concerns: Feeling well overall, however, states she is worried because she felt less fetal movement on Tuesday night. States that she was walking to the trash and something scared her. Notes that her heart started racing. She reports that she does not remember feeling baby move the rest of the night; was previously moving a lot during the day. She states that since Tuesday night, she has felt fetal movement and has been able to get her fm counts. She would like to monitor baby because although she is getting her normal movement, she is worried about the decreased movement on Tuesday.  Denies vaginal bleeding, discharge or leakage of fluid.  Reports irregular cramping; less than previously.   No HA, visual changes, RUQ or epigastric pain.     Objective:  Filed Vitals:    17 0925   BP: 109/60   Height: 1.613 m (5' 3.5\")   Weight: 69.037 kg (152 lb 3.2 oz)      See OB flowsheet    NST reactive- baseline 145, moderate variability, +accels, no decels.  Irregular contractions.    Assessment/Plan     Encounter Diagnosis   Name Primary?     Encounter for supervision of normal first pregnancy in third trimester Yes      Patient education/orders or handouts today:  Sign/symptoms of labor, When to call for labor or other concerns and NST    - Reviewed why/how to contact provider if headache/visual changes/RUQ or epigastric pain, decreased fetal movement, vaginal bleeding, leakage of fluid or strong/regular contractions.  NST reactive.   Reviewed labor precautions, fetal movement counts and s/s to call/present to triage.  Need to discuss IOL vs BPP at 41 " weeks at next visit.  Continue scheduled prenatal care, RTC in 1 week and prn if questions or concerns.     KILLIAN Thomas, AARONM

## 2017-02-03 NOTE — NURSING NOTE
Chief Complaint   Patient presents with     Prenatal Care   38.4 weeks ob visit- has a cold-  Gave safe medication list to her.   Decreased fetal movement in the last week.

## 2017-02-03 NOTE — MR AVS SNAPSHOT
After Visit Summary   2/3/2017    Susanna Gleason    MRN: [id number]           Patient Information     Date Of Birth          1985        Visit Information        Provider Department      2/3/2017 9:15 AM Rocío Ortiz CNM [facility]         Follow-ups after your visit        Follow-up notes from your care team     Return in about 1 week (around 2/10/2017) for PAIGE ADLER.      Your next 10 appointments already scheduled     Feb 10, 2017 11:15 AM   RETURN OB with KILLIAN Rainey CNM   [facility] ([facility])    [facility] [address]  [address]  [facility] [address] [phone]            Feb 13, 2017 10:30 AM   RETURN OB with KILLIAN Rainey CNM   [facility] ([facility])    [facility] [address]  [address]  [facility] [address] [phone]              Who to contact     Please call your clinic at [phone] to:    Ask questions about your health    Make or cancel appointments    Discuss your medicines    Learn about your test results    Speak to your doctor   If you have compliments or concerns about an experience at your clinic, or if you wish to file a complaint, please contact [facility] Patient Relations at [phone] or email us at [email or web address]         Additional Information About Your Visit        MyChart Information     [employer] gives you secure access to your electronic health record. If you see a primary care provider, you can also send messages to your care team and make appointments. If you have questions, please call your primary care clinic.  If you do not have a primary care provider, please call [phone] and they will assist you.      [employer] is an electronic gateway that provides easy, online access to your medical records. With [employer],  "you can request a clinic appointment, read your test results, renew a prescription or communicate with your care team.     To access your existing account, please contact your [facility] or call [phone] for assistance.        Care EveryWhere ID     This is your Care EveryWhere ID. This could be used by other organizations to access your [facility] medical records  [id number]        Your Vitals Were     Height BMI (Body Mass Index) Last Period             1.613 m (5' 3.5\") 26.53 kg/m2 05/08/2016 (Exact Date)          Blood Pressure from Last 3 Encounters:   02/03/17 109/60   01/27/17 99/65   01/20/17 106/62    Weight from Last 3 Encounters:   02/03/17 69.037 kg (152 lb 3.2 oz)   01/27/17 68.357 kg (150 lb 11.2 oz)   01/20/17 67.813 kg (149 lb 8 oz)              Today, you had the following     No orders found for display       Primary Care Provider    Physician No Ref-Primary       No address on file        Thank you!     Thank you for choosing [facility]  for your care. Our goal is always to provide you with excellent care. Hearing back from our patients is one way we can continue to improve our services. Please take a few minutes to complete the written survey that you may receive in the mail after your visit with us. Thank you!             Your Updated Medication List - Protect others around you: Learn how to safely use, store and throw away your medicines at www.disposemymeds.org.          This list is accurate as of: 2/3/17 10:16 AM.  Always use your most recent med list.                   Brand Name Dispense Instructions for use    breast pump Misc     1 each    1 each daily as needed       prenatal multivitamin  plus iron 27-0.8 MG Tabs per tablet      Take 1 tablet by mouth daily       vitamin D 2000 UNITS tablet     180 tablet    Take 2 tablets by mouth daily Take two tablets daily.         "

## 2017-02-06 ENCOUNTER — TELEPHONE (OUTPATIENT)
Dept: OBGYN | Facility: CLINIC | Age: 32
End: 2017-02-06

## 2017-02-06 ENCOUNTER — HOSPITAL ENCOUNTER (INPATIENT)
Facility: CLINIC | Age: 32
LOS: 2 days | Discharge: HOME HEALTH | End: 2017-02-08
Attending: ADVANCED PRACTICE MIDWIFE | Admitting: ADVANCED PRACTICE MIDWIFE
Payer: COMMERCIAL

## 2017-02-06 ENCOUNTER — ANESTHESIA EVENT (OUTPATIENT)
Dept: OBGYN | Facility: CLINIC | Age: 32
End: 2017-02-06
Payer: COMMERCIAL

## 2017-02-06 ENCOUNTER — ANESTHESIA (OUTPATIENT)
Dept: OBGYN | Facility: CLINIC | Age: 32
End: 2017-02-06
Payer: COMMERCIAL

## 2017-02-06 LAB
ABO + RH BLD: NORMAL
ABO + RH BLD: NORMAL
BASOPHILS # BLD AUTO: 0 10E9/L (ref 0–0.2)
BASOPHILS NFR BLD AUTO: 0.1 %
DIFFERENTIAL METHOD BLD: ABNORMAL
EOSINOPHIL # BLD AUTO: 0 10E9/L (ref 0–0.7)
EOSINOPHIL NFR BLD AUTO: 0.2 %
ERYTHROCYTE [DISTWIDTH] IN BLOOD BY AUTOMATED COUNT: 13.9 % (ref 10–15)
HCT VFR BLD AUTO: 39.7 % (ref 35–47)
HGB BLD-MCNC: 13.5 G/DL (ref 11.7–15.7)
IMM GRANULOCYTES # BLD: 0.1 10E9/L (ref 0–0.4)
IMM GRANULOCYTES NFR BLD: 0.7 %
LYMPHOCYTES # BLD AUTO: 1.3 10E9/L (ref 0.8–5.3)
LYMPHOCYTES NFR BLD AUTO: 11 %
MCH RBC QN AUTO: 35.1 PG (ref 26.5–33)
MCHC RBC AUTO-ENTMCNC: 34 G/DL (ref 31.5–36.5)
MCV RBC AUTO: 103 FL (ref 78–100)
MONOCYTES # BLD AUTO: 0.6 10E9/L (ref 0–1.3)
MONOCYTES NFR BLD AUTO: 5.2 %
NEUTROPHILS # BLD AUTO: 9.8 10E9/L (ref 1.6–8.3)
NEUTROPHILS NFR BLD AUTO: 82.8 %
NRBC # BLD AUTO: 0 10*3/UL
NRBC BLD AUTO-RTO: 0 /100
PLATELET # BLD AUTO: 206 10E9/L (ref 150–450)
RBC # BLD AUTO: 3.85 10E12/L (ref 3.8–5.2)
SPECIMEN EXP DATE BLD: NORMAL
WBC # BLD AUTO: 11.8 10E9/L (ref 4–11)

## 2017-02-06 PROCEDURE — 86901 BLOOD TYPING SEROLOGIC RH(D): CPT | Performed by: ADVANCED PRACTICE MIDWIFE

## 2017-02-06 PROCEDURE — 3E0R3CZ INTRODUCTION OF REGIONAL ANESTHETIC INTO SPINAL CANAL, PERCUTANEOUS APPROACH: ICD-10-PCS | Performed by: ANESTHESIOLOGY

## 2017-02-06 PROCEDURE — 99215 OFFICE O/P EST HI 40 MIN: CPT

## 2017-02-06 PROCEDURE — 86780 TREPONEMA PALLIDUM: CPT | Performed by: ADVANCED PRACTICE MIDWIFE

## 2017-02-06 PROCEDURE — 25800025 ZZH RX 258: Performed by: ADVANCED PRACTICE MIDWIFE

## 2017-02-06 PROCEDURE — 12000032 ZZH R&B OB CRITICAL UMMC

## 2017-02-06 PROCEDURE — 85025 COMPLETE CBC W/AUTO DIFF WBC: CPT | Performed by: ADVANCED PRACTICE MIDWIFE

## 2017-02-06 PROCEDURE — 25000125 ZZHC RX 250

## 2017-02-06 PROCEDURE — 25000125 ZZHC RX 250: Performed by: ANESTHESIOLOGY

## 2017-02-06 PROCEDURE — 36415 COLL VENOUS BLD VENIPUNCTURE: CPT | Performed by: ADVANCED PRACTICE MIDWIFE

## 2017-02-06 PROCEDURE — 00HU33Z INSERTION OF INFUSION DEVICE INTO SPINAL CANAL, PERCUTANEOUS APPROACH: ICD-10-PCS | Performed by: ANESTHESIOLOGY

## 2017-02-06 PROCEDURE — 0KQM0ZZ REPAIR PERINEUM MUSCLE, OPEN APPROACH: ICD-10-PCS | Performed by: ADVANCED PRACTICE MIDWIFE

## 2017-02-06 PROCEDURE — 10907ZC DRAINAGE OF AMNIOTIC FLUID, THERAPEUTIC FROM PRODUCTS OF CONCEPTION, VIA NATURAL OR ARTIFICIAL OPENING: ICD-10-PCS | Performed by: ADVANCED PRACTICE MIDWIFE

## 2017-02-06 PROCEDURE — 86900 BLOOD TYPING SEROLOGIC ABO: CPT | Performed by: ADVANCED PRACTICE MIDWIFE

## 2017-02-06 PROCEDURE — 72200001 ZZH LABOR CARE VAGINAL DELIVERY SINGLE

## 2017-02-06 PROCEDURE — 25000125 ZZHC RX 250: Performed by: ADVANCED PRACTICE MIDWIFE

## 2017-02-06 RX ORDER — LANOLIN 100 %
OINTMENT (GRAM) TOPICAL
Status: DISCONTINUED | OUTPATIENT
Start: 2017-02-06 | End: 2017-02-08 | Stop reason: HOSPADM

## 2017-02-06 RX ORDER — LIDOCAINE HYDROCHLORIDE 10 MG/ML
INJECTION, SOLUTION EPIDURAL; INFILTRATION; INTRACAUDAL; PERINEURAL PRN
Status: DISCONTINUED | OUTPATIENT
Start: 2017-02-06 | End: 2017-02-07 | Stop reason: HOSPADM

## 2017-02-06 RX ORDER — OXYTOCIN/0.9 % SODIUM CHLORIDE 30/500 ML
100 PLASTIC BAG, INJECTION (ML) INTRAVENOUS CONTINUOUS
Status: DISCONTINUED | OUTPATIENT
Start: 2017-02-06 | End: 2017-02-08 | Stop reason: HOSPADM

## 2017-02-06 RX ORDER — OXYTOCIN/0.9 % SODIUM CHLORIDE 30/500 ML
100-340 PLASTIC BAG, INJECTION (ML) INTRAVENOUS CONTINUOUS PRN
Status: COMPLETED | OUTPATIENT
Start: 2017-02-06 | End: 2017-02-06

## 2017-02-06 RX ORDER — NALOXONE HYDROCHLORIDE 0.4 MG/ML
.1-.4 INJECTION, SOLUTION INTRAMUSCULAR; INTRAVENOUS; SUBCUTANEOUS
Status: DISCONTINUED | OUTPATIENT
Start: 2017-02-06 | End: 2017-02-06

## 2017-02-06 RX ORDER — OXYTOCIN 10 [USP'U]/ML
INJECTION, SOLUTION INTRAMUSCULAR; INTRAVENOUS
Status: DISCONTINUED
Start: 2017-02-06 | End: 2017-02-07 | Stop reason: HOSPADM

## 2017-02-06 RX ORDER — OXYTOCIN 10 [USP'U]/ML
10 INJECTION, SOLUTION INTRAMUSCULAR; INTRAVENOUS
Status: DISCONTINUED | OUTPATIENT
Start: 2017-02-06 | End: 2017-02-08 | Stop reason: HOSPADM

## 2017-02-06 RX ORDER — NALOXONE HYDROCHLORIDE 0.4 MG/ML
.1-.4 INJECTION, SOLUTION INTRAMUSCULAR; INTRAVENOUS; SUBCUTANEOUS
Status: DISCONTINUED | OUTPATIENT
Start: 2017-02-06 | End: 2017-02-08 | Stop reason: HOSPADM

## 2017-02-06 RX ORDER — IBUPROFEN 400 MG/1
400-800 TABLET, FILM COATED ORAL EVERY 6 HOURS PRN
Status: DISCONTINUED | OUTPATIENT
Start: 2017-02-06 | End: 2017-02-08 | Stop reason: HOSPADM

## 2017-02-06 RX ORDER — IBUPROFEN 800 MG/1
800 TABLET, FILM COATED ORAL
Status: DISCONTINUED | OUTPATIENT
Start: 2017-02-06 | End: 2017-02-06

## 2017-02-06 RX ORDER — ACETAMINOPHEN 325 MG/1
650 TABLET ORAL EVERY 4 HOURS PRN
Status: DISCONTINUED | OUTPATIENT
Start: 2017-02-06 | End: 2017-02-08 | Stop reason: HOSPADM

## 2017-02-06 RX ORDER — LIDOCAINE HYDROCHLORIDE AND EPINEPHRINE 15; 5 MG/ML; UG/ML
INJECTION, SOLUTION EPIDURAL PRN
Status: DISCONTINUED | OUTPATIENT
Start: 2017-02-06 | End: 2017-02-07 | Stop reason: HOSPADM

## 2017-02-06 RX ORDER — FENTANYL CITRATE 50 UG/ML
50-100 INJECTION, SOLUTION INTRAMUSCULAR; INTRAVENOUS
Status: DISCONTINUED | OUTPATIENT
Start: 2017-02-06 | End: 2017-02-06

## 2017-02-06 RX ORDER — ACETAMINOPHEN 325 MG/1
650 TABLET ORAL EVERY 4 HOURS PRN
Status: DISCONTINUED | OUTPATIENT
Start: 2017-02-06 | End: 2017-02-06

## 2017-02-06 RX ORDER — CARBOPROST TROMETHAMINE 250 UG/ML
250 INJECTION, SOLUTION INTRAMUSCULAR
Status: DISCONTINUED | OUTPATIENT
Start: 2017-02-06 | End: 2017-02-06

## 2017-02-06 RX ORDER — HYDROCORTISONE 2.5 %
CREAM (GRAM) TOPICAL 3 TIMES DAILY PRN
Status: DISCONTINUED | OUTPATIENT
Start: 2017-02-06 | End: 2017-02-08 | Stop reason: HOSPADM

## 2017-02-06 RX ORDER — OXYCODONE HYDROCHLORIDE 5 MG/1
5-10 TABLET ORAL
Status: DISCONTINUED | OUTPATIENT
Start: 2017-02-06 | End: 2017-02-08 | Stop reason: HOSPADM

## 2017-02-06 RX ORDER — OXYTOCIN/0.9 % SODIUM CHLORIDE 30/500 ML
340 PLASTIC BAG, INJECTION (ML) INTRAVENOUS CONTINUOUS PRN
Status: DISCONTINUED | OUTPATIENT
Start: 2017-02-06 | End: 2017-02-08 | Stop reason: HOSPADM

## 2017-02-06 RX ORDER — MISOPROSTOL 200 UG/1
400 TABLET ORAL
Status: DISCONTINUED | OUTPATIENT
Start: 2017-02-06 | End: 2017-02-08 | Stop reason: HOSPADM

## 2017-02-06 RX ORDER — NALBUPHINE HYDROCHLORIDE 10 MG/ML
2.5-5 INJECTION, SOLUTION INTRAMUSCULAR; INTRAVENOUS; SUBCUTANEOUS EVERY 6 HOURS PRN
Status: DISCONTINUED | OUTPATIENT
Start: 2017-02-06 | End: 2017-02-08 | Stop reason: HOSPADM

## 2017-02-06 RX ORDER — SODIUM CHLORIDE, SODIUM LACTATE, POTASSIUM CHLORIDE, CALCIUM CHLORIDE 600; 310; 30; 20 MG/100ML; MG/100ML; MG/100ML; MG/100ML
INJECTION, SOLUTION INTRAVENOUS CONTINUOUS
Status: DISCONTINUED | OUTPATIENT
Start: 2017-02-06 | End: 2017-02-06

## 2017-02-06 RX ORDER — OXYCODONE AND ACETAMINOPHEN 5; 325 MG/1; MG/1
1 TABLET ORAL
Status: DISCONTINUED | OUTPATIENT
Start: 2017-02-06 | End: 2017-02-06

## 2017-02-06 RX ORDER — EPHEDRINE SULFATE 50 MG/ML
5 INJECTION, SOLUTION INTRAMUSCULAR; INTRAVENOUS; SUBCUTANEOUS
Status: DISCONTINUED | OUTPATIENT
Start: 2017-02-06 | End: 2017-02-08 | Stop reason: HOSPADM

## 2017-02-06 RX ORDER — METHYLERGONOVINE MALEATE 0.2 MG/ML
200 INJECTION INTRAVENOUS
Status: DISCONTINUED | OUTPATIENT
Start: 2017-02-06 | End: 2017-02-06

## 2017-02-06 RX ORDER — BISACODYL 10 MG
10 SUPPOSITORY, RECTAL RECTAL DAILY PRN
Status: DISCONTINUED | OUTPATIENT
Start: 2017-02-08 | End: 2017-02-08 | Stop reason: HOSPADM

## 2017-02-06 RX ORDER — ONDANSETRON 2 MG/ML
4 INJECTION INTRAMUSCULAR; INTRAVENOUS EVERY 6 HOURS PRN
Status: DISCONTINUED | OUTPATIENT
Start: 2017-02-06 | End: 2017-02-06

## 2017-02-06 RX ORDER — AMOXICILLIN 250 MG
1-2 CAPSULE ORAL 2 TIMES DAILY
Status: DISCONTINUED | OUTPATIENT
Start: 2017-02-06 | End: 2017-02-08 | Stop reason: HOSPADM

## 2017-02-06 RX ORDER — OXYTOCIN/0.9 % SODIUM CHLORIDE 30/500 ML
PLASTIC BAG, INJECTION (ML) INTRAVENOUS
Status: COMPLETED
Start: 2017-02-06 | End: 2017-02-06

## 2017-02-06 RX ORDER — OXYTOCIN 10 [USP'U]/ML
10 INJECTION, SOLUTION INTRAMUSCULAR; INTRAVENOUS
Status: DISCONTINUED | OUTPATIENT
Start: 2017-02-06 | End: 2017-02-06

## 2017-02-06 RX ADMIN — SODIUM CHLORIDE, POTASSIUM CHLORIDE, SODIUM LACTATE AND CALCIUM CHLORIDE 500 ML: 600; 310; 30; 20 INJECTION, SOLUTION INTRAVENOUS at 16:08

## 2017-02-06 RX ADMIN — SODIUM CHLORIDE, POTASSIUM CHLORIDE, SODIUM LACTATE AND CALCIUM CHLORIDE 1000 ML: 600; 310; 30; 20 INJECTION, SOLUTION INTRAVENOUS at 15:13

## 2017-02-06 RX ADMIN — LIDOCAINE HYDROCHLORIDE,EPINEPHRINE BITARTRATE 3 ML: 15; .005 INJECTION, SOLUTION EPIDURAL; INFILTRATION; INTRACAUDAL; PERINEURAL at 15:50

## 2017-02-06 RX ADMIN — LIDOCAINE HYDROCHLORIDE 10 ML: 10 INJECTION, SOLUTION EPIDURAL; INFILTRATION; INTRACAUDAL; PERINEURAL at 22:35

## 2017-02-06 RX ADMIN — Medication 5 ML: at 15:56

## 2017-02-06 RX ADMIN — LIDOCAINE HYDROCHLORIDE 5 ML: 10 INJECTION, SOLUTION EPIDURAL; INFILTRATION; INTRACAUDAL; PERINEURAL at 15:53

## 2017-02-06 RX ADMIN — OXYTOCIN-SODIUM CHLORIDE 0.9% IV SOLN 30 UNIT/500ML 340 ML/HR: 30-0.9/5 SOLUTION at 22:22

## 2017-02-06 RX ADMIN — Medication 340 ML/HR: at 22:22

## 2017-02-06 NOTE — ANESTHESIA PROCEDURE NOTES
Epidural Procedure Note    Staff:     Anesthesiologist:  RICK DEWITT  Location: OB   Pre-procedure checklist:   patient identified, IV checked, site marked, risks and benefits discussed, informed consent, monitors and equipment checked, pre-op evaluation, at physician/surgeon's request and post-op pain management      Correct Patient: Yes      Correct Position: Yes      Correct Site: Yes      Correct Procedure: Yes      Correct Laterality:  Yes    Site Marked:  Yes  Procedure:     Procedure:  Epidural catheter    Position:  Sitting    Sterile Prep: Betadine, mask, sterile gloves and patient draped      Insertion site:  L3-4    Local skin infiltration:  1% lidocaine    amount (mL):  3    Approach:  Midline    Needle gauge (G):  17    Needle Length (in):  3.5    Block Needle Type:  Sathyauhkristen    Injection Technique:  LORT saline    LUCY at (cm):  6    Attempts:  2    Catheter gauge (G):  19    Catheter threaded easily: Yes      Threaded to cm at skin:  11    Threaded in epidural space (cm):  5    Paresthesias:  No    Aspiration negative for Heme or CSF: Yes      Test dose (mL):  3     Local anesthetic:  Lidocaine 1.5% w/ 1:200,000 epinephrine    Test dose negative for signs of intravascular, subdural or intrathecal injection: Yes

## 2017-02-06 NOTE — TELEPHONE ENCOUNTER
Spoke to Susanna's spouse, I could hear her in background. She is  at 39 wks GA reporting very strong contractions every 4 minutes the last hour. He reports she is very uncomfortable. Denies LOF. Baby active. GNS negative.  Instructed [facility] to [facility] for [facility].He indicated understanding and agreed with plan.      Spoke to charge nurse at [facility] that Susanna midwife, pt coming in for r/o labor,needing Mandarin .

## 2017-02-06 NOTE — LETTER
[facility] Postpartum Home Care Referral  [facility]  [address]  [facility] [address]  Phone: [phone]    Date of Referral: 2017    Susanna Gleason MRN# [id number]   Age: 31 year old YOB: 1985           Date of Admission:  2017  2:09 PM    Primary care provider: No Ref-Primary, Physician  Attending Provider: Teresa Francois*    Payor: MEDICA / Plan: MEDICA ESSENTIAL / Product Type: Indemnity /          Pregnancy History:   The details of the mother's pregnancy are as follows:  OBSTETRIC HISTORY:  @[age@  EDC: Estimated Date of Delivery: 17  Obstetric History       T1      TAB0   SAB0   E0   M0   L1       # Outcome Date GA Lbr Harmeet/2nd Weight Sex Delivery Anes PTL Lv   1 Term 17 39w1d 10:11 / 04:10 3.76 kg (8 lb 4.6 oz) F Vag-Spont EPI N Y      Name: ANGELA GLEASON      Apgar1:  8                Apgar5: 9          Prenatal Labs:   Lab Results   Component Value Date    ABO O 2017    RH  Pos 2017    AS Neg 2016    HEPBANG Nonreactive 2016    CHPCRT  2016     Negative   Negative for C. trachomatis rRNA by transcription mediated amplification.   A negative result by transcription mediated amplification does not preclude the   presence of C. trachomatis infection because results are dependent on proper   and adequate collection, absence of inhibitors, and sufficient rRNA to be   detected.      GCPCRT  2016     Negative   Negative for N. gonorrhoeae rRNA by transcription mediated amplification.   A negative result by transcription mediated amplification does not preclude the   presence of N. gonorrhoeae infection because results are dependent on proper   and adequate collection, absence of inhibitors, and sufficient rRNA to be   detected.      TREPAB Negative 2017    HGB 10.8* 2017       GBS Status:  Lab Results   Component Value Date    GBS  2017     Negative  No GBS DNA detected,  "presumed negative for GBS or number of bacteria may be   below the limit of detection of the assay.   Assay performed on incubated broth culture of specimen using [employer] real-time   PCR.                Maternal History:   Maternal past medical history, problem list and prior to admission medications reviewed and unremarkable.                      Family History:   I have reviewed this patient's family history          Social History:   I have reviewed this 's social history       Birth  History:     [place] Birth Information  Information for the patient's :  Jd Gleason [[id number]]     Birth History   Vitals     Birth     Length: 0.533 m (1' 9\")     Weight: 3.76 kg (8 lb 4.6 oz)     HC 33 cm     Apgar     One: 8     Five: 9     Delivery Method: Vaginal, Spontaneous Delivery     Gestation Age: 39 1/7 wks       Information for the patient's :  Jd Gleason [[id number]]     Immunization History   Administered Date(s) Administered     Hepatitis B 2017             Information     Feeding plan:   Information for the patient's :  Jd Gleason [[id number]]           Latch:   Information for the patient's :  Jd Gleason [[id number]]   9      Information for the patient's :  Jd Gleason [[id number]]   Vitals  Heart Rate: 138  Heart Sounds: no murmur detected  Cardiac Regularity: Regular  Resp: 40  Temp: 99.1  F (37.3  C)  Temp src: Axillary  SpO2: 99 %      Information for the patient's :  Jd Gleason [[id number]]         Information for the patient's :  Jd Gleason [[id number]]   Weight: 3.595 kg (7 lb 14.8 oz)     Information for the patient's :  Jd Gleason [[id number]]   Percent Weight Change Since Birth: -4.4       Information for the patient's :  Jd Gleason [[id number]]   Hearing Screen Date: 17    Information for the patient's :  Jd Gleason [[id number]]   Hearing Response: Left pass, " Right pass      Bilirubin Results:     Information for the patient's :  Jd Gleason [[id number]]   No results for input(s): TCBIL, BILINEONATAL in the last 04775 hours.           Discharge Meds:      Susanna Gleason   Home Medication Instructions JEREMY:[id number]    Printed on:17 1259   Medication Information                      acetaminophen (ACETAMINOPHEN EXTRA STRENGTH) 500 MG tablet  Take 1-2 tablets (500-1,000 mg) by mouth every 6 hours as needed for mild pain             Cholecalciferol (VITAMIN D) 2000 UNITS tablet  Take 2 tablets by mouth daily Take two tablets daily.             ibuprofen (ADVIL/MOTRIN) 600 MG tablet  Take 1 tablet (600 mg) by mouth every 6 hours as needed for moderate pain             Misc. Devices (BREAST PUMP) MISC  1 each daily as needed             Prenatal Vit-Fe Fumarate-FA (PRENATAL MULTIVITAMIN  PLUS IRON) 27-0.8 MG TABS  Take 1 tablet by mouth daily             sennosides (SENOKOT) 8.6 MG tablet  Take 1 tablet by mouth 2 times daily as needed for constipation                 Information for the patient's :  Jd Gleason [[id number]]      Jd Gleason   Home Medication Instructions JEREMY:[id number]    Printed on:17 1259   Medication Information                      POLY-Vi-SOL (POLY-VI-SOL) solution  Take 1 mL by mouth daily                     Summary of Plan of Care:     Home Care to draw [place] Screen? No    Home Care Agency referred to: SAHIL Del Castillo RN

## 2017-02-06 NOTE — IP AVS SNAPSHOT
MRN:[id number]                      After Visit Summary   2/6/2017    Susanna Gleason    MRN: [id number]           Thank you!     Thank you for choosing [facility] for your care. Our goal is always to provide you with excellent care. Hearing back from our patients is one way we can continue to improve our services. Please take a few minutes to complete the written survey that you may receive in the mail after you visit with us. Thank you!        Patient Information     Date Of Birth          1985        About your hospital stay     You were [facility] on:  February 6, 2017 [facility]    You were discharged on:  February 8, 2017       Who to Call     For medical emergencies, please call 911.  For non-urgent questions about your medical care, please call your primary care provider or clinic, None          Attending Provider     Provider    Rocío Ortiz CNM King, Andrea N, APRN CNM Page, Ann Louise Forster, APRN CNM       Primary Care Provider    Physician No Ref-Primary       No address on file        Your next 10 appointments already scheduled     Feb 08, 2017 12:00 PM   [place] [facility] with [employer]    Services Department ([facility])    [address] [place] [id number]               Feb 09, 2017 10:00 AM   [place] [facility] with Abrahan Richards    Services Department ([facility])    [address] [place] [phone]               Feb 20, 2017 10:45 AM   RETURN EXTENDED with KILLIAN Rainey CNM   [facility] ([facility])    [place] [facility] [address]  [address]  [facility] [address]   [phone]              Further instructions from your care team       Postpartum Vaginal Delivery Instructions    Activity        Ask family and friends for help when you need it.    Do not place anything in your vagina for 6 weeks.    You are not restricted on other activities, but take it easy for a few weeks to allow your body to recover from delivery.  You are able to do any activities you feel up to that point.    No driving until you have stopped taking your pain medications (usually two weeks after delivery).     Call your health care provider if you have any of these symptoms:       Increased pain, swelling, redness, or fluid around your stiches from an episiotomy or perineal tear.    A fever above 100.4 F (38 C) with or without chills when placing a thermometer under your tongue.    You soak a sanitary pad with blood within 1 hour, or you see blood clots larger than a golf ball.    Bleeding that lasts more than 6 weeks.    Vaginal discharge that smells bad.    Severe pain, cramping or tenderness in your lower belly area.    A need to urinate more frequently (use the toilet more often), more urgently (use the toilet very quickly), or it burns when you urinate.    Nausea and vomiting.    Redness, swelling or pain around a vein in your leg.    Problems breastfeeding or a red or painful area on your breast.    Chest pain and cough or are gasping for air.    Problems coping with sadness, anxiety, or depression.  If you have any concerns about hurting yourself or the baby, call your provider immediately.     You have questions or concerns after you return home.     Keep your hands clean:  Always wash your hands before touching your perineal area and stitches.  This helps reduce your risk of infection.  If your hands aren't dirty, you may use an alcohol hand-rub to clean your hands. Keep your nails clean and short.        Pending Results     No orders found from 2/5/2017 to 2/7/2017.            Admission Information        Provider Department Dept Phone    2/6/2017 KILLIAN Strickland CNM [facility] [phone]      Your Vitals Were   "   Blood Pressure Pulse Temperature Respirations    105/71 mmHg 80 98.3  F (36.8  C) (Oral) 16    Height Weight BMI (Body Mass Index) Pulse Oximetry    1.613 m (5' 3.5\") 69 kg (152 lb 1.9 oz) 26.52 kg/m2 98%    Last Period             2016 (Exact Date)         [employer] Information     [employer] gives you secure access to your electronic health record. If you see a primary care provider, you can also send messages to your care team and make appointments. If you have questions, please call your primary care clinic.  If you do not have a primary care provider, please call [phone] and they will assist you.        Care EveryWhere ID     This is your Care EveryWhere ID. This could be used by other organizations to access your [facility] medical records  [id number]           Review of your medicines      START taking        Dose / Directions    acetaminophen 500 MG tablet   Commonly known as:  ACETAMINOPHEN EXTRA STRENGTH   Used for:   (normal spontaneous vaginal delivery)        Dose:  500-1000 mg   Take 1-2 tablets (500-1,000 mg) by mouth every 6 hours as needed for mild pain   Quantity:  60 tablet   Refills:  3       ibuprofen 600 MG tablet   Commonly known as:  ADVIL/MOTRIN   Used for:   (normal spontaneous vaginal delivery)        Dose:  600 mg   Take 1 tablet (600 mg) by mouth every 6 hours as needed for moderate pain   Quantity:  90 tablet   Refills:  1       sennosides 8.6 MG tablet   Commonly known as:  SENOKOT   Used for:   (normal spontaneous vaginal delivery)        Dose:  1 tablet   Take 1 tablet by mouth 2 times daily as needed for constipation   Quantity:  60 tablet   Refills:  1         CONTINUE these medicines which have NOT CHANGED        Dose / Directions    breast pump Misc   Used for:  Lactation disorder        Dose:  1 each   1 each daily as needed   Quantity:  1 each   Refills:  0       prenatal multivitamin  plus iron 27-0.8 MG Tabs per tablet        Dose:  1 tablet   Take 1 " tablet by mouth daily   Refills:  0       vitamin D 2000 UNITS tablet   Used for:  Vitamin D deficiency, Encounter for supervision of normal first pregnancy in second trimester        Dose:  2 tablet   Take 2 tablets by mouth daily Take two tablets daily.   Quantity:  180 tablet   Refills:  3            Where to get your medicines      These medications were sent to [facility] [place], [place] - [address]  [address], [facility] [address]     Phone:  [phone]    - acetaminophen 500 MG tablet  - ibuprofen 600 MG tablet  - sennosides 8.6 MG tablet             Protect others around you: Learn how to safely use, store and throw away your medicines at www.disposemymeds.org.             Medication List: This is a list of all your medications and when to take them. Check marks below indicate your daily home schedule. Keep this list as a reference.      Medications           Morning Afternoon Evening Bedtime As Needed    acetaminophen 500 MG tablet   Commonly known as:  ACETAMINOPHEN EXTRA STRENGTH   Take 1-2 tablets (500-1,000 mg) by mouth every 6 hours as needed for mild pain   Last time this was given:  650 mg on 2/8/2017  5:42 AM                                breast pump Misc   1 each daily as needed                                ibuprofen 600 MG tablet   Commonly known as:  ADVIL/MOTRIN   Take 1 tablet (600 mg) by mouth every 6 hours as needed for moderate pain   Last time this was given:  800 mg on 2/8/2017  7:38 AM                                prenatal multivitamin  plus iron 27-0.8 MG Tabs per tablet   Take 1 tablet by mouth daily                                sennosides 8.6 MG tablet   Commonly known as:  SENOKOT   Take 1 tablet by mouth 2 times daily as needed for constipation                                vitamin D 2000 UNITS tablet   Take 2 tablets by mouth daily Take two tablets daily.

## 2017-02-06 NOTE — IP AVS SNAPSHOT
[facility]    [address] [address]    Phone:  [phone]                                       After Visit Summary   2/6/2017    Susanna Gleason    MRN: [id number]           After Visit Summary Signature Page     I have received my discharge instructions, and my questions have been answered. I have discussed any challenges I see with this plan with the nurse or doctor.    ..........................................................................................................................................  Patient/Patient Representative Signature      ..........................................................................................................................................  Patient Representative Print Name and Relationship to Patient    ..................................................               ................................................  Date                                            Time    ..........................................................................................................................................  Reviewed by Signature/Title    ...................................................              ..............................................  Date                                                            Time

## 2017-02-06 NOTE — H&P
ADMIT NOTE  =================  39w1d  Susanna Gleason is a 31 year old female    with an Patient's last menstrual period was 2016 (exact date). and Estimated Date of Delivery: 2017 [facility] 2017 at 2:21 PM in active labor.   Fetal movement- active  ROM- no   GBS- negative    HPI  ================  Pt presented to  in active labor.  Reports contractions began at 0400, have become more frequent and increased in frequency.  Reports +FM, denies LOF or VB.  FOB- is involved  Other labor support-  (Becca) and mother    PRENATAL COURSE  =================  Prenatal course was essentially uncomplicated Regular care received at [facility] clinic.  Started care at 9 weeks gestation, 11 total visits.  TWG  152 - 100 =  52 #. Ht 63.5.  BMI 17.4  Denies smoking, drug, etoh use during pregnancy.    HISTORIES  ============  No Known Allergies  Past Medical History   Diagnosis Date     NO ACTIVE PROBLEMS      Past Surgical History   Procedure Laterality Date     No history of surgery     .  Family History   Problem Relation Age of Onset     Family History Negative       Hypertension Maternal Grandmother 60     Social History   Substance Use Topics     Smoking status: Never Smoker      Smokeless tobacco: Not on file     Alcohol Use: No     Obstetric History       T0      TAB0   SAB0   E0   M0   L0       # Outcome Date GA Lbr Harmeet/2nd Weight Sex Delivery Anes PTL Lv   1 Current                  LABS:   ===========  Rhogam not indicated  ABO        O   2016    RH      Pos   2016  RUBELLAABIGG immune 16  RPR negative 16   HIV negative 16  HGB     11.9   2017   HEPBANG   Nonreactive   2016  GBS   *   2017    Value: Negative  No GBS DNA detected, presumed negative for GBS or number of bacteria may be   below the limit of detection of the assay.   Assay performed on incubated broth culture of specimen using [employer] real-time   PCR.    other labs-  none    ULTRA SOUND:  ==============  First trimester US- confirmed LMP dating and Second trimester screening US- WNL.    ROS  =========  Pt denies significant respiratory, cardiovacular, GI, or muscular/skeletalcomplaints.    See RN data base ROS.     PHYSICAL EXAM:  ===============  Last menstrual period 2016.  General appearance: uncomfortable with contractions  Lungs: normal respiratory effort  Neuro: denies headache and visual disturbances  Psych: Mentation normal and bright   Legs: no edema     Abdomen: gravid, single vertex fetus, non-tender between contractions.  EFW-  7 lbs.   CONTACTIONS: Contractions every 4-5 minutes.  Palpate: moderate  FETAL HEART TONES: baseline 130 with moderate FHR variability and  pos accelerations. No decelerations present.      PELVIC EXAM: 7/ 100%/ Anterior/ soft/ 0, scant bloody show on glove  MORATAYA SCORE: 12  BLOODY SHOW: yes   ROM: No  FLUID: none  AMNISURE: not done    ASSESSMENT:  ==============  IUP @ 39w1d in active labor   Fetal Heart rate tracing category one  GBS- negative     PLAN:  ===========  Admit - see IP orders  Pain medication as needed.    Anticipate JOAN DOVER consultant on call Dr. Castro/ jenny Lee CNM. KILLIAN

## 2017-02-06 NOTE — PROGRESS NOTES
"Blood pressure 121/65, height 1.613 m (5' 3.5\"), weight 69 kg (152 lb 1.9 oz), last menstrual period 2016.    General appearance: reports feeling much more relief with epidural; FOB, , and mother bedside for support  CONTRACTIONS: moderate and every 4-6 minutes  Pitocin- none,  Antibiotics- none  FETAL HEART TONES: continuous EFM- baseline 145 with moderate variability and positive accelerations. No decelerations.  ROM: not ruptured  PELVIC EXAM: deferred    ASSESSMENT:  ==============  IUP @ 39w1d in active labor   Fetal Heart Rate Tracing category one  GBS- negative    PLAN:  ===========  Frequent position changes to facilitate labor with epidural anesthesia.  Pain medication- epidural.  Used nitrous prior to epidural  Reevaluate in 2-4 hours prn  Anticipate JOAN DOVER consultant on call Dr. Miller/ available prn     KILLIAN Mark CNM    "

## 2017-02-06 NOTE — ANESTHESIA PREPROCEDURE EVALUATION
Anesthesia Evaluation       history and physical reviewed .      No history of anesthetic complications     ROS/MED HX    ENT/Pulmonary:  - neg pulmonary ROS     Neurologic:  - neg neurologic ROS     Cardiovascular:  - neg cardiovascular ROS       METS/Exercise Tolerance:     Hematologic:         Musculoskeletal:         GI/Hepatic:  - neg GI/hepatic ROS       Renal/Genitourinary:         Endo:         Psychiatric:         Infectious Disease:         Malignancy:         Other:               Physical Exam  Normal systems: cardiovascular, pulmonary and dental    Airway   Mallampati: II  TM distance: > 3 FB  Neck ROM: full  Mouth opening: > 3 cm    Dental     Cardiovascular       Pulmonary     Other findings: Interviewed with ipad       neg OB ROS                 Anesthesia Plan      History & Physical Review      ASA Status:  .  OB Epidural Asa: 2            Postoperative Care      Consents  Anesthetic plan, risks, benefits and alternatives discussed with:  Patient..

## 2017-02-07 ENCOUNTER — OFFICE VISIT (OUTPATIENT)
Dept: INTERPRETER SERVICES | Facility: CLINIC | Age: 32
End: 2017-02-07

## 2017-02-07 LAB
HGB BLD-MCNC: 10.8 G/DL (ref 11.7–15.7)
T PALLIDUM IGG+IGM SER QL: NEGATIVE

## 2017-02-07 PROCEDURE — T1013 SIGN LANG/ORAL INTERPRETER: HCPCS | Mod: U3

## 2017-02-07 PROCEDURE — 25000132 ZZH RX MED GY IP 250 OP 250 PS 637: Performed by: ADVANCED PRACTICE MIDWIFE

## 2017-02-07 PROCEDURE — 36415 COLL VENOUS BLD VENIPUNCTURE: CPT | Performed by: ADVANCED PRACTICE MIDWIFE

## 2017-02-07 PROCEDURE — 85018 HEMOGLOBIN: CPT | Performed by: ADVANCED PRACTICE MIDWIFE

## 2017-02-07 PROCEDURE — 12000030 ZZH R&B OB INTERMEDIATE UMMC

## 2017-02-07 RX ORDER — IBUPROFEN 600 MG/1
600 TABLET, FILM COATED ORAL EVERY 6 HOURS PRN
Qty: 90 TABLET | Refills: 1 | Status: SHIPPED | OUTPATIENT
Start: 2017-02-07 | End: 2017-02-08

## 2017-02-07 RX ORDER — SENNOSIDES 8.6 MG
1 TABLET ORAL 2 TIMES DAILY PRN
Qty: 60 TABLET | Refills: 1 | Status: SHIPPED | OUTPATIENT
Start: 2017-02-07 | End: 2017-03-20

## 2017-02-07 RX ORDER — ACETAMINOPHEN 500 MG
500-1000 TABLET ORAL EVERY 6 HOURS PRN
Qty: 60 TABLET | Refills: 3 | Status: SHIPPED | OUTPATIENT
Start: 2017-02-07 | End: 2017-03-20

## 2017-02-07 RX ADMIN — ACETAMINOPHEN 650 MG: 325 TABLET, FILM COATED ORAL at 13:28

## 2017-02-07 RX ADMIN — IBUPROFEN 800 MG: 400 TABLET ORAL at 13:28

## 2017-02-07 RX ADMIN — ACETAMINOPHEN 650 MG: 325 TABLET, FILM COATED ORAL at 06:38

## 2017-02-07 RX ADMIN — OXYCODONE HYDROCHLORIDE 5 MG: 5 TABLET ORAL at 06:39

## 2017-02-07 RX ADMIN — IBUPROFEN 800 MG: 400 TABLET ORAL at 00:42

## 2017-02-07 RX ADMIN — IBUPROFEN 800 MG: 400 TABLET ORAL at 06:34

## 2017-02-07 RX ADMIN — IBUPROFEN 800 MG: 400 TABLET ORAL at 19:54

## 2017-02-07 RX ADMIN — SENNOSIDES AND DOCUSATE SODIUM 2 TABLET: 8.6; 5 TABLET ORAL at 13:17

## 2017-02-07 RX ADMIN — SENNOSIDES AND DOCUSATE SODIUM 2 TABLET: 8.6; 5 TABLET ORAL at 19:54

## 2017-02-07 RX ADMIN — ACETAMINOPHEN 650 MG: 325 TABLET, FILM COATED ORAL at 17:08

## 2017-02-07 RX ADMIN — ACETAMINOPHEN 650 MG: 325 TABLET, FILM COATED ORAL at 21:23

## 2017-02-07 NOTE — PROVIDER NOTIFICATION
02/07/17 0823   Provider Notification   Provider Name/Title Dipak   Method of Notification At Bedside   Notification Reason Other   Provider assessed perineum, states that the swelling is okay and is soft, probably not a hematoma. Plan per provider is to keep an eye on the swelling, have patient soak in the tub and use tucks/icepacks

## 2017-02-07 NOTE — PLAN OF CARE
Problem: Postpartum, Vaginal Delivery (Adult)  Goal: Signs and Symptoms of Listed Potential Problems Will be Absent or Manageable (Postpartum, Vaginal Delivery)  Signs and symptoms of listed potential problems will be absent or manageable by discharge/transition of care (reference Postpartum, Vaginal Delivery (Adult) CPG).  Outcome: No Change  Data: Susanna Gleason transferred to 7135 via wheelchair at 0120. Baby transferred via parent's arms.  Action: Receiving unit notified of transfer: Yes. Patient and family notified of room change. Report given to CHAIM Gibbons at bedside upon arrival. Belongings sent to receiving unit. Accompanied by Registered Nurse. Oriented patient to surroundings. Call light within reach. ID bands double-checked with receiving RN.  Response: Patient tolerated transfer and is stable.

## 2017-02-07 NOTE — PROVIDER NOTIFICATION
02/07/17 0239   Provider Notification   Provider Name/Title Page   Method of Notification Electronic Page   Request Evaluate-Remote   Notification Reason Other  (Hematoma )   Informed provider of hematoma on perinium.

## 2017-02-07 NOTE — PROGRESS NOTES
Post Partum Note    Susanna Gleason      MRN#: [id number]  Age: 31 year old      YOB: 1985      Post-partum day #1    SIGNIFICANT PROBLEMS:  Patient Active Problem List    Diagnosis Date Noted     Labor and delivery, indication for care 2017     Priority: Medium      (normal spontaneous vaginal delivery) 2017     Priority: Medium     Fall 2017     Priority: Medium     Constipation during pregnancy in third trimester 2017     Priority: Medium     17 - Reivewed dietary changes - increase PO water, avoid constipating foods, increase dietary fiber/metamucille(sugar free), daily exercise.  Colace RX BID given. Miralax prn reviewed.        Carpal tunnel syndrome during pregnancy 2016     Priority: Medium     2016 - Declined RX, will get OTC wrist splint.       Excessive weight gain in pregnancy in third trimester 2016     Priority: Medium     2017 - No food journal to review.  Is eating bananas daily, milk daily.  Encouraged again to review carbohydrate intake, increase water, increase physical activity.   2016 - Reviewed importance of balanced, protein rich diet in pregnancy. Reviewed at length avoidance of simple carbohydrates in pregnancy (limit fruit, milk, pasta, etc). Encouraged food journal, bring to next visit to [facility]. Pt may send via my chart before next visit for earlier review         Left axillary swelling 2016     Priority: Medium     17 - Swelling no longer present.  NT, no redness or mass/lump/bump.  Continue surveillance.  2016 - L lymph node noted swollen but NT, no s/s of infection. Possible swelling of breast tissue.   RTO in 2 weeks and prn if symptoms worsen. Consider US prn.          Perioral dermatitis 10/26/2016     Priority: Low     Class: Acute     Migraine without aura 2016     Class: Chronic     16: Reports history of migraine headaches prior to pregnancy, approximately 4-5x/year; Reoccurring  "since pregnancy, not currently taking medication   To schedule appointment with Dr. Sumner _____       Vitamin D deficiency 2016     Taking supplementation       , CNM Pt, STEPHANI  2016     Class: Acute     2016 - EOB done with .  Declined water birth.  EOB for next appt as had more questions.   : 9wk1 day, only sm subchorionic hemorrhage  16- US- 7 6/7wks, moderate subchorionic hemorrhage.    2/3/17: NST in clinic reactive             INTERVAL HISTORY:  BP 98/63 mmHg  Pulse 80  Temp(Src) 97.7  F (36.5  C) (Oral)  Resp 18  Ht 1.613 m (5' 3.5\")  Wt 69 kg (152 lb 1.9 oz)  BMI 26.52 kg/m2  SpO2 98%  LMP 2016 (Exact Date)  Breastfeeding? Unknown    Pt stable, baby is rooming in  Breast feeding status:initiated  Complications since 2 hours post delivery: None  Patient is tolerating acitivity well Voiding without difficulty, cramping is minimal, lochia is decreasing and patient denies clots.  Perineal pain is is relieved by narcotics.  The perineum has significant right labial edema present with bruising noted.  Palpates soft, no evidence of hematoma.      Normal postpartum exam with labial edema    Postpartum hemoglobin   HEMOGLOBIN   Date Value Ref Range Status   2017 10.8* 11.7 - 15.7 g/dL Final     Blood type ABO        O   2017  RH      Pos   2017  Rubella status:  Immune    ASSESSMENT/PLAN:  Stable Post-partum day #1  Complications:none  Plan d/c home tomorrow  RTC 6 weeks  Teaching done: D/C Instructions: Nutrition/Activity, Engorgement Management, Birth Control Options, Warning Signs/When to Call: Excessive Bleeding, Infection, PP Depression, Kegals and Crunches, RTC Clinic for PP Appointment and PNV    Postpartum warning s/s reviewed, including bleeding/clots, fever, mastitis, or depression    Birthcontrol planned:Condoms  Current Discharge Medication List      START taking these medications    Details   sennosides (SENOKOT) 8.6 MG tablet " Take 1 tablet by mouth 2 times daily as needed for constipation  Qty: 60 tablet, Refills: 1    Associated Diagnoses:  (normal spontaneous vaginal delivery)      ibuprofen (ADVIL/MOTRIN) 600 MG tablet Take 1 tablet (600 mg) by mouth every 6 hours as needed for moderate pain  Qty: 90 tablet, Refills: 1    Associated Diagnoses:  (normal spontaneous vaginal delivery)      acetaminophen (ACETAMINOPHEN EXTRA STRENGTH) 500 MG tablet Take 1-2 tablets (500-1,000 mg) by mouth every 6 hours as needed for mild pain  Qty: 60 tablet, Refills: 3    Associated Diagnoses:  (normal spontaneous vaginal delivery)         CONTINUE these medications which have NOT CHANGED    Details   Cholecalciferol (VITAMIN D) 2000 UNITS tablet Take 2 tablets by mouth daily Take two tablets daily.  Qty: 180 tablet, Refills: 3    Associated Diagnoses: Vitamin D deficiency; Encounter for supervision of normal first pregnancy in second trimester      Prenatal Vit-Fe Fumarate-FA (PRENATAL MULTIVITAMIN  PLUS IRON) 27-0.8 MG TABS Take 1 tablet by mouth daily      Misc. Devices (BREAST PUMP) MISC 1 each daily as needed  Qty: 1 each, Refills: 0    Associated Diagnoses: Lactation disorder             KILLIAN Kay CNM

## 2017-02-07 NOTE — PROGRESS NOTES
"Blood pressure 120/63, temperature 98.2  F (36.8  C), temperature source Oral, height 1.613 m (5' 3.5\"), weight 69 kg (152 lb 1.9 oz), last menstrual period 2016, SpO2 100 %.  General appearance: comfortable  With epidural. Feeling some pressure    CONTACTIONS: Contractions every 3-4 minutes.  Palpate: strong  Pitocin- none,  Antibiotics- none  FETAL HEART TONES: baseline 145 with moderate FHR variability and  pos accelerations.  No decelerations present.    ROM: not ruptured  PELVIC EXAM:PELVIC EXAM: 10/ 100%/ Anterior/ soft/ -1 AROM for copious clear fluid    ASSESSMENT:  ==============  IUP @ 39w1d active labor   Fetal Heart rate tracing Category category one  GBS- negative  Epidural, labored down x 2 hours  Start 2nd stage     PLAN:  ===========  comfort measures prn   Anticipate   Start side lying pushing     Teresa Francois CNM, APRN        "

## 2017-02-07 NOTE — PLAN OF CARE
Problem: Goal Outcome Summary  Goal: Goal Outcome Summary  Outcome: Improving  Patient stable through out shift. Encouraged patient many times to soak in the tub to help with bottom soreness. Patient using ice packs and tucks as well as pain medications. Patient very tired this afternoon. Encouraged patient to rest in between breastfeedings.  Continue with plan of care.

## 2017-02-07 NOTE — PROGRESS NOTES
"Blood pressure 119/75, height 1.613 m (5' 3.5\"), weight 69 kg (152 lb 1.9 oz), last menstrual period 2016, SpO2 97 %.    General appearance: comfortable with epidural, reports feeling more intense pressure in bottom with contractions  CONTRACTIONS: moderate and every 4-6 minutes  Pitocin- none,  Antibiotics- none  FETAL HEART TONES: continuous EFM- baseline 145 with moderate variability and positive accelerations. No decelerations.  ROM: not ruptured  PELVIC EXAM: 10/ 100%/ 0, BBOW    ASSESSMENT:  ==============  IUP @ 39w1d in transition labor   Fetal Heart Rate Tracing category one  GBS- negative    PLAN:  ===========  Frequent position changes to facilitate labor with epidural anesthesia.  Labor down x 1-2 hours or until pt feels urge to push  Reevaluate in 1-2 hours prn  Anticipate JOAN DOVER consultant on call Dr. Miller/ available prn     KILLIAN Mark CNM    "

## 2017-02-07 NOTE — L&D DELIVERY NOTE
CNM Delivery Note  Labor Course: patient arrived at 7cm with , , and mother.   Pain meds: received epidural shortly after admit  IUP at 39 weeks gestation delivered on 2017.     delivery of a viable Female infant.  Weight : 8 pounds 5 ounces   Apgars of 8 at 1 minute and 9 at 5 minutes.  Labor was spontaneous.  Medications administered  in labor:  Pain Rx Epidural; Antibiotics No; Other   Perineum: 2nd degree, on maternal left. Repaired in the usual fashion  Placenta-mechanism: spontaneous, intact,  with a 3 vessel cord. IV oxytocin was given.  QBLs was 238.  Complications of pregnancy, labor and delivery: None  Anticipated d/c date: 17  Birth attendants:KILLIAN Strickland,NAYELY      Delivery Summary    Susanna Gleason MRN# [id number]   Age: 31 year old YOB: 1985     Labor Event Times:    Labor Onset Date 2017       Labor Onset Time 8:00 AM    Dilation Complete Date 2017    Dilation Complete Time 6:11 PM       Start Pushing Date         Start Pushing Time             Labor Length:    1st Stage (hrs/min) 10.00  11.00    2nd Stage (hrs/min) 4.00  10.00    3rd Stage (hrs/min)             Labor Events:     Labor No    Indicator      Rupture Date 2017    Rupture Time 8:30 pm    Rupture Type Artificial rupture of membranes [5]    Fluid Color Clear    Labor Type Spontaneous    Induction      Induction Indication          Augmentation      Labor Complications      Additional Complications      Management of Labor         Antibiotics      IV Antibiotic Given      Additional Management      Fetal Status Prior to  Delivery Category 2    Fetal Status Comments          Cervical Ripening:    Date       Time       Type           Delivery:    Episiotomy None    Local Anesthetic    1% lidocaine    Lacerations 2nd    Sponge Count Correct        Needle Count Correct      Final Count by: OTTO Peralta RN    Sutures 3-0 vicryl, 4-0 vicryl    Vaginal delivery est. blood  "loss (mL      Surgical or additional est. blood loss (mL)      Combined est. blood loss (mL):      Packing Intentionally Left In            Information for the patient's :  Jd Gleason [[id number]]       Delivery  2017 10:21 PM by  Vaginal, Spontaneous Delivery  Sex:  female Gestational Age: 39w1d  Delivery Clinician:  Teresa Francois  Living?: Yes          APGARS  One minute Five minutes Ten minutes   Skin color: 1   1        Heart rate: 2   2        Grimace: 2   2        Muscle tone: 2   2        Breathin   2        Totals: 8  9         Presentation/position: Vertex  Left Occiput Anterior  Resuscitation and Interventions: Method:     Oxygen Type:     Intubation Time:   # of Attempts:     ETT Size:        Tracheal Suction:     Tracheal returns:      [place] Care at Delivery:  Infant made weak cry at delivery, placed on mom's chest. Dried and stimulated by CNM and RN. Infant remains skin to skin with mom in delivery room.         Cord information: 3 Vessels   Disposition of cord blood: Lab    Blood gases sent? No  Complications: None   Placenta: Delivered: 2017 10:31 PM  Spontaneous    appearance.  Comments:  .  Disposition: Hospital disposal   Measurements:  Weight: 8 lb 4.6 oz (3760 g)  Height: 21\"  Head circumference: 33 cm  Chest circumference:     Temperature:     Other providers:       Additional  information:  Forceps:    Verbal Informed Consent Obtained:       Alternative Labor Strategies Discussed:     Emergency Resources Available:       Type:       Accrued Pulling Time:       # of Pulls:      Position:     Fetal Station:       Indications:      Other Indications:     Operative Vaginal Delivery Brief Note Forceps:        Vacuum:    Verbal Informed Consent Obtained:     Alternative Labor Strategies Discussed:     Emergency Resources Available:     Type:      Accrued Pulling Time:       # of Pop-Offs:       # of Pulls:       Position:     Fetal Station:      Indications " for Vacuum:       Other Indications:    Operative Vaginal Delivery Brief Note Vacuum:        Shoulder Dystocia Shoulder Dystocia    Fetal Tracing Prior to Delivery:  Category 2   Shoulder dystocia present?:  No                                            Breech:       : Type:     Indications for Primary:     Indications for Secondary:     Other Indications:        Observed anomalies     Output in Delivery Room:

## 2017-02-07 NOTE — PLAN OF CARE
Problem: Goal Outcome Summary  Goal: Goal Outcome Summary  Outcome: Improving  Pt transferred to [facility] at 0120. Bedside report was received. Infant/parent bands verified. Room orientation, folder provided and reviewed with parents via . PP assessment WNL. Vital signs stable. Pt U/1, midline, firm, scant flow. Hematoma on perineum noted, midwife is aware. Perineum very swollen, pt stating pain, ice to site. IV, SL. Pain managed with Ibuprofen, Tylenol, Roxicodone for uterine cramping and perineal swelling-see MAR.  Pt up ad marylin, with stand by assist. Voiding appropriately. Encouraged adequate fluid intake. Breastfeeding infant on cue, latch of proper holds and adequate latch. Educated on feeding on cues and hand massage/expression prior and after feedings.  is in room and a positive support system. Will continue to monitor and with pt plan of care.

## 2017-02-07 NOTE — PLAN OF CARE
Problem: Labor (Cervical Ripen, Induct, Augment) (Adult,Obstetrics,Pediatric)  Goal: Signs and Symptoms of Listed Potential Problems Will be Absent or Manageable (Labor)  Signs and symptoms of listed potential problems will be absent or manageable by discharge/transition of care (reference Labor (Cervical Ripen, Induct, Augment) (Adult,Obstetrics,Pediatric) CPG).  Outcome: Completed Date Met:  17   of viable Female with A Forester Priscila CNM in attendance.  Nursery RN Nuha present. Infant with spontaneous cry to mothers abdomen, dried and stimulated. Apgars 8/9. Placenta delivered without complications, pitocin bolused, 2nd degree vaginal laceration, repairs done. Jo cares provided. Mother and baby in stable condition. Baby skin to skin attempting baby-cue led latching for breastfeeding.

## 2017-02-08 ENCOUNTER — OFFICE VISIT (OUTPATIENT)
Dept: INTERPRETER SERVICES | Facility: CLINIC | Age: 32
End: 2017-02-08

## 2017-02-08 VITALS
DIASTOLIC BLOOD PRESSURE: 71 MMHG | HEIGHT: 64 IN | SYSTOLIC BLOOD PRESSURE: 105 MMHG | RESPIRATION RATE: 16 BRPM | TEMPERATURE: 98.3 F | OXYGEN SATURATION: 98 % | BODY MASS INDEX: 25.97 KG/M2 | WEIGHT: 152.12 LBS | HEART RATE: 80 BPM

## 2017-02-08 PROCEDURE — 25000132 ZZH RX MED GY IP 250 OP 250 PS 637: Performed by: ADVANCED PRACTICE MIDWIFE

## 2017-02-08 PROCEDURE — T1013 SIGN LANG/ORAL INTERPRETER: HCPCS | Mod: U3

## 2017-02-08 RX ORDER — IBUPROFEN 600 MG/1
600 TABLET, FILM COATED ORAL EVERY 6 HOURS PRN
Qty: 90 TABLET | Refills: 1 | Status: SHIPPED | OUTPATIENT
Start: 2017-02-08 | End: 2017-03-20

## 2017-02-08 RX ADMIN — IBUPROFEN 800 MG: 400 TABLET ORAL at 07:38

## 2017-02-08 RX ADMIN — IBUPROFEN 800 MG: 400 TABLET ORAL at 01:27

## 2017-02-08 RX ADMIN — ACETAMINOPHEN 650 MG: 325 TABLET, FILM COATED ORAL at 01:27

## 2017-02-08 RX ADMIN — ACETAMINOPHEN 650 MG: 325 TABLET, FILM COATED ORAL at 05:42

## 2017-02-08 RX ADMIN — SENNOSIDES AND DOCUSATE SODIUM 1 TABLET: 8.6; 5 TABLET ORAL at 07:38

## 2017-02-08 NOTE — LACTATION NOTE
"This note was copied from the chart of Baby1 Susanna Gleason.  S: \" I am so sore when my baby is breastfeeding and I want to learn how to position her so I am not hurting\".    O: Baby girl Jesusita delivered via  at 39 wks gestation at 3760 gr and today she is at 3595 gr. She has been feeding on demand and has feed more than 8 times in 24 hours. This is mother's first baby.     Breast exam of the mother: intact everted nipples, colostrum present with hand massage, breasts soft and changed with pregnancy.     A: Upon LC visit baby was just waking up and LC described feeding cues and mother repeated back to LC. LC demonstrated hand massage and expression and mother demonstrated back to LC. LC demonstrated how to offer good breast support and how to bring baby up and over the nipple for a good deep latch. Mother practiced multiple times and was successful. Mother stated she was comfortable. Mother stated she wanted a LC visit after discharge and this LC made an appt for her as well as provided LC contact.     P: Dyad preparing for discharge. Appt with LC 17 at 1pm  "

## 2017-02-08 NOTE — PROGRESS NOTES
[facility] Discharge Summary    Susanna Gleason MRN# [id number]   Age: 31 year old YOB: 1985     Date of Admission:  2017  Date of Discharge::  2017  Admitting Physician:  KILLIAN Flanagan CNM  Discharge Physician:  Daria Butts CNM, MS      Home clinic: [facility]          Admission Diagnoses:   maternity*rogelio:  17contracting  Labor and delivery, indication for care         Discharge Diagnosis:   Normal spontaneous vaginal delivery  Intrauterine pregnancy at 39 weeks gestation          Procedures:   Procedure(s): Repair of second degree perineal laceration       No other significant procedures performed during this admission           Medications Prior to Admission:     Prescriptions prior to admission   Medication Sig Dispense Refill Last Dose     Cholecalciferol (VITAMIN D) 2000 UNITS tablet Take 2 tablets by mouth daily Take two tablets daily. 180 tablet 3 2017 at 2200     Prenatal Vit-Fe Fumarate-FA (PRENATAL MULTIVITAMIN  PLUS IRON) 27-0.8 MG TABS Take 1 tablet by mouth daily   2017 at 2230     Misc. Devices (BREAST PUMP) MISC 1 each daily as needed 1 each 0 Not Taking             Discharge Medications:     Current Discharge Medication List      START taking these medications    Details   sennosides (SENOKOT) 8.6 MG tablet Take 1 tablet by mouth 2 times daily as needed for constipation  Qty: 60 tablet, Refills: 1    Associated Diagnoses:  (normal spontaneous vaginal delivery)      ibuprofen (ADVIL/MOTRIN) 600 MG tablet Take 1 tablet (600 mg) by mouth every 6 hours as needed for moderate pain  Qty: 90 tablet, Refills: 1    Associated Diagnoses:  (normal spontaneous vaginal delivery)      acetaminophen (ACETAMINOPHEN EXTRA STRENGTH) 500 MG tablet Take 1-2 tablets (500-1,000 mg) by mouth every 6 hours as needed for mild pain  Qty: 60 tablet, Refills: 3    Associated Diagnoses:  (normal spontaneous vaginal delivery)        "  CONTINUE these medications which have NOT CHANGED    Details   Cholecalciferol (VITAMIN D) 2000 UNITS tablet Take 2 tablets by mouth daily Take two tablets daily.  Qty: 180 tablet, Refills: 3    Associated Diagnoses: Vitamin D deficiency; Encounter for supervision of normal first pregnancy in second trimester      Prenatal Vit-Fe Fumarate-FA (PRENATAL MULTIVITAMIN  PLUS IRON) 27-0.8 MG TABS Take 1 tablet by mouth daily      Misc. Devices (BREAST PUMP) MISC 1 each daily as needed  Qty: 1 each, Refills: 0    Associated Diagnoses: Lactation disorder                   Consultations:   Consultation during this admission received from Lactation          Brief History of Labor:   CNM Delivery Note  Labor Course: patient arrived at 7cm with , , and mother.    Pain meds: received epidural shortly after admit  IUP at 39 weeks gestation delivered on 2017.      delivery of a viable Female infant.  Weight : 8 pounds 5 ounces    Apgars of 8 at 1 minute and 9 at 5 minutes.  Labor was spontaneous.  Medications administered  in labor:  Pain Rx Epidural; Antibiotics No; Other    Perineum: 2nd degree, on maternal left. Repaired in the usual fashion  Placenta-mechanism: spontaneous, intact,  with a 3 vessel cord. IV oxytocin was given.  QBLs was 238.  Complications of pregnancy, labor and delivery: None  Anticipated d/c date: 17  Birth attendants:KILLIAN Strickland,NAYELY       Assessment Day of Discharge    Vital signs:  Temp: 97.9  F (36.6  C) Temp src: Oral BP: 98/60 mmHg Pulse: 80 Heart Rate: 74 Resp: 20       Height: 161.3 cm (5' 3.5\") Weight: 69 kg (152 lb 1.9 oz)  Estimated body mass index is 26.52 kg/(m^2) as calculated from the following:    Height as of this encounter: 1.613 m (5' 3.5\").    Weight as of this encounter: 69 kg (152 lb 1.9 oz).      Breasts: Soft, filling  Nipples: Intact, Non-tender  Abdomen: Soft, Non-tender    Diastatis Recti:  1-2 FB  Uterus: Fundus Firm, Non-tender, located " 3cm below the umbilicus   Lochia: Rubra, appropriate amount    Perineum:  Well-approximated, small bruising on R labia, minimal swelling.  Palpates soft, no evidence of hematoma.   Lower Extremities: Trace Edema Bilateral to ankle, Negative Rigo's Sign.           Hospital Course:   The patient's hospital course was unremarkable.  On discharge, her pain was well controlled with ibuprofen and tylenol. Vaginal bleeding is similar to peak menstrual flow.  Voiding without difficulty.  Ambulating well and tolerating a normal diet.  No fever.  Breastfeeding well - demonstrated independent deep latch, +swallows.  Infant is stable.   +bowel movement yesterday without incident.  Pt and FOB communicating effectively in English with CNM, declined  services.  She was discharged on post-partum day #2.    Post-partum hemoglobin:   HEMOGLOBIN   Date Value Ref Range Status   2017 10.8* 11.7 - 15.7 g/dL Final        Rh:positive  Rubella status: Immune  Plan for contraception:Abstinence and then condoms  Reviewed Chapter One of  [facility] including warning signs of postpartum, activity level, avoiding IC for 6 weeks, Tub soaks BID, Kegels, abdominal exercises, breast care,  postpartum depression/anxiety. Pt verbalized understanding with teach back.          Discharge Instructions and Follow-Up:   Discharge diet: Regular. Iron rich, High Fiber   Discharge activity: Pelvic rest: abstain from intercourse and do not use tampons for 6 week(s)   Discharge follow-up: Follow up with CNM in 1-2 weeks for perineal assessment/breastfeeding support  Follow up with CNM in 6 weeks and prn   Wound care: Drink plenty of fluids  Ice to area for comfort/TUCKS pads  Keep wound clean and dry  Sitz bath 2-3 x/day  Avoid constipation           Discharge Disposition:   Discharged to home        KILLIAN Ochoa CNM

## 2017-02-08 NOTE — PLAN OF CARE
Problem: Goal Outcome Summary  Goal: Goal Outcome Summary  Outcome: Improving  States perineal discomfort well controlled with regular tylenol, ibuprofen, and cold packs. Also took tub bath for comfort.   present and helpful/supportive.  Breastfeeding baby with minimal assistance, and is sometimes able to latch baby independently.  Voiding without difficulty, lochia minimal, VSS.

## 2017-02-08 NOTE — PLAN OF CARE
Home care referral placed through [facility] care for first time breastfeeding mother.

## 2017-02-08 NOTE — PLAN OF CARE
"Problem: Goal Outcome Summary  Goal: Goal Outcome Summary  Outcome: Improving  Vital signs WDL. Perineum bruised, all other assessments WDL. Patient verbalizes pain is well-controlled with hot packs and medication, see MAR. Ice pack on perineum, encouraged to use tucks pads. Voiding, passing gas and has had BM since delivery. Ambulating independently, instructed to call for assistance. Breastfeeding with minimal assistance, help with proper positioning. Encouraged to breast massage and hand express, declined expression when assisting because it was \"too painful\".  present, supportive. Bonding well with baby, positive attachment. Asking several appropriate questions r/t  and self care, continue to educate. Educated on signs of bleeding to report. Will continue to monitor.        "

## 2017-02-08 NOTE — PLAN OF CARE
Problem: Goal Outcome Summary  Goal: Goal Outcome Summary  Outcome: Adequate for Discharge Date Met:  02/08/17  Data: Vital signs within normal limits. Postpartum checks within normal limits - see flow record. Patient eating and drinking normally. Patient able to empty bladder independently and is up ambulating. No apparent signs of infection. perineum healing well. Patient performing self cares and is able to care for infant.  Action: Patient medicated during the shift for pain. See MAR. Patient reassessed within 1 hour after each medication and pain was improved - patient stated she was comfortable. Patient education done about discharge instructions, discharge medications, and attended the discharge class. See flow record.  Response: Positive attachment behaviors observed with infant. Support persons  present.   Plan: Anticipate discharge today.

## 2017-02-08 NOTE — PLAN OF CARE
[facility] care referral placed through [facility]  For first time breastfeeding mother due to patient's insurance through [facility] not covered.

## 2017-02-20 ENCOUNTER — OFFICE VISIT (OUTPATIENT)
Dept: OBGYN | Facility: CLINIC | Age: 32
End: 2017-02-20
Attending: ADVANCED PRACTICE MIDWIFE
Payer: COMMERCIAL

## 2017-02-20 VITALS
HEART RATE: 82 BPM | BODY MASS INDEX: 23.01 KG/M2 | WEIGHT: 134.8 LBS | DIASTOLIC BLOOD PRESSURE: 71 MMHG | SYSTOLIC BLOOD PRESSURE: 105 MMHG | HEIGHT: 64 IN

## 2017-02-20 DIAGNOSIS — Z34.03 ENCOUNTER FOR SUPERVISION OF NORMAL FIRST PREGNANCY IN THIRD TRIMESTER: ICD-10-CM

## 2017-02-20 PROCEDURE — 99213 OFFICE O/P EST LOW 20 MIN: CPT | Mod: ZF

## 2017-02-20 ASSESSMENT — PAIN SCALES - GENERAL: PAINLEVEL: NO PAIN (0)

## 2017-02-20 NOTE — MR AVS SNAPSHOT
After Visit Summary   2/20/2017    Susanna Gleason    MRN: [id number]           Patient Information     Date Of Birth          1985        Visit Information        Provider Department      2/20/2017 10:30 AM Daria Butts APRN CNM; [facility]        Today's Diagnoses     Postpartum care and examination of lactating mother    -  1    Encounter for supervision of normal first pregnancy in third trimester           Follow-ups after your visit        Follow-up notes from your care team     Discussed this visit Return in about 4 weeks (around 3/20/2017) for Postpartum Exam.      Your next 10 appointments already scheduled     Mar 20, 2017 10:30 AM CDT   Return Visit with KILLIAN Rainey CNM   [facility] ([facility])    [facility] [address]  [address]  [facility] [id number] [phone]              Who to contact     Please call your clinic at [phone] to:    Ask questions about your health    Make or cancel appointments    Discuss your medicines    Learn about your test results    Speak to your doctor   If you have compliments or concerns about an experience at your clinic, or if you wish to file a complaint, please contact [facility] Patient Relations at [phone] or email us at [email or web address]         Additional Information About Your Visit        MyChart Information     [employer] gives you secure access to your electronic health record. If you see a primary care provider, you can also send messages to your care team and make appointments. If you have questions, please call your primary care clinic.  If you do not have a primary care provider, please call [phone] and they will assist you.      [employer] is an electronic gateway that provides easy, online access to your medical records. With [employer], you can request a  "clinic appointment, read your test results, renew a prescription or communicate with your care team.     To access your existing account, please contact your [facility] or call [phone] for assistance.        Care EveryWhere ID     This is your Care EveryWhere ID. This could be used by other organizations to access your [place] medical records  [id number]        Your Vitals Were     Pulse Height Last Period BMI (Body Mass Index)          82 1.613 m (5' 3.5\") 05/08/2016 (Exact Date) 23.5 kg/m2         Blood Pressure from Last 3 Encounters:   02/20/17 105/71   02/08/17 105/71   02/03/17 109/60    Weight from Last 3 Encounters:   02/20/17 61.1 kg (134 lb 12.8 oz)   02/06/17 69 kg (152 lb 1.9 oz)   02/03/17 69 kg (152 lb 3.2 oz)              Today, you had the following     No orders found for display       Primary Care Provider    Physician No Ref-Primary       No address on file        Thank you!     Thank you for choosing [facility]  for your care. Our goal is always to provide you with excellent care. Hearing back from our patients is one way we can continue to improve our services. Please take a few minutes to complete the written survey that you may receive in the mail after your visit with us. Thank you!             Your Updated Medication List - Protect others around you: Learn how to safely use, store and throw away your medicines at www.disposemymeds.org.          This list is accurate as of: 2/20/17 11:59 PM.  Always use your most recent med list.                   Brand Name Dispense Instructions for use    acetaminophen 500 MG tablet    ACETAMINOPHEN EXTRA STRENGTH    60 tablet    Take 1-2 tablets (500-1,000 mg) by mouth every 6 hours as needed for mild pain       breast pump Misc     1 each    1 each daily as needed       ibuprofen 600 MG tablet    ADVIL/MOTRIN    90 tablet    Take 1 tablet (600 mg) by mouth every 6 hours as needed for moderate " pain       prenatal multivitamin  plus iron 27-0.8 MG Tabs per tablet      Take 1 tablet by mouth daily       sennosides 8.6 MG tablet    SENOKOT    60 tablet    Take 1 tablet by mouth 2 times daily as needed for constipation

## 2017-02-20 NOTE — PROGRESS NOTES
"  Nursing Notes:   Dharmesh Bhatt LPN  2017 11:07 AM  Addendum  Pt c/o \"sore bottem\" and light bleeding.      - Pt presents for 2 week postpartum follow up on breastfeeding and mood check with , FOB, pt's mother.    - Breastfeeding during assessment.  + milk noted, nipples intact.   - Scant dark blood.  No malodor.  - Ibuprofen OTC.  Bath 1-2x/day.  - No constipation.      ================================================================  ROS: 10 point ROS neg other than the symptoms noted above in the HPI.   EXAM:  /71 (BP Location: Left arm, Patient Position: Chair, Cuff Size: Adult Regular)  Pulse 82  Ht 1.613 m (5' 3.5\")  Wt 61.1 kg (134 lb 12.8 oz)  LMP 2016 (Exact Date)  BMI 23.5 kg/m2    General: healthy, alert, no distress and cooperative  Psych: NEGATIVE  Breasts:  Lactating, Nipples intact with no lesions, Non-tender and No S/S of yeast or mastitis    Defer remainder of physical exam until 6 week postpartum visit.       ASSESSMENT:   Encounter Diagnoses   Name Primary?     Postpartum care and examination of lactating mother Yes     Encounter for supervision of normal first pregnancy in third trimester       Normal 2 week postpartum exam after       PLAN:  - Continue sitz bath TID, increase PO hydration/fiber diet/stool softener.  - Reassured pt and FOB, encouraged self care.  - RTO in 4 weeks for postpartum exam and prn     Daria DAILY CNM  "

## 2017-02-20 NOTE — LETTER
"2017       RE: Susanna Gleason  [address] [facility] [address]     Dear Colleague,    Thank you for referring your patient, Susanna Gleason, to the [facility] at [facility]. Please see a copy of my visit note below.      Nursing Notes:   Dharmesh Bhatt LPN  2017 11:07 AM  Addendum  Pt c/o \"sore bottem\" and light bleeding.      - Pt presents for 2 week postpartum follow up on breastfeeding and mood check with , FOB, pt's mother.    - Breastfeeding during assessment.  + milk noted, nipples intact.   - Scant dark blood.  No malodor.  - Ibuprofen OTC.  Bath 1-2x/day.  - No constipation.      ================================================================  ROS: 10 point ROS neg other than the symptoms noted above in the HPI.   EXAM:  /71 (BP Location: Left arm, Patient Position: Chair, Cuff Size: Adult Regular)  Pulse 82  Ht 1.613 m (5' 3.5\")  Wt 61.1 kg (134 lb 12.8 oz)  LMP 2016 (Exact Date)  BMI 23.5 kg/m2    General: healthy, alert, no distress and cooperative  Psych: NEGATIVE  Breasts:  Lactating, Nipples intact with no lesions, Non-tender and No S/S of yeast or mastitis    Defer remainder of physical exam until 6 week postpartum visit.       ASSESSMENT:   Encounter Diagnoses   Name Primary?     Postpartum care and examination of lactating mother Yes     Encounter for supervision of normal first pregnancy in third trimester       Normal 2 week postpartum exam after       PLAN:  - Continue sitz bath TID, increase PO hydration/fiber diet/stool softener.  - Reassured pt and FOB, encouraged self care.  - RTO in 4 weeks for postpartum exam and prn     Daria DAILY CNM        "

## 2017-03-20 ENCOUNTER — OFFICE VISIT (OUTPATIENT)
Dept: OBGYN | Facility: CLINIC | Age: 32
End: 2017-03-20
Payer: COMMERCIAL

## 2017-03-20 VITALS
HEART RATE: 81 BPM | BODY MASS INDEX: 22.34 KG/M2 | SYSTOLIC BLOOD PRESSURE: 102 MMHG | DIASTOLIC BLOOD PRESSURE: 64 MMHG | WEIGHT: 128.1 LBS

## 2017-03-20 DIAGNOSIS — Z01.419 WELL WOMAN EXAM: ICD-10-CM

## 2017-03-20 DIAGNOSIS — Z30.09 BIRTH CONTROL COUNSELING: ICD-10-CM
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
## 2017-03-20 PROCEDURE — 99212 OFFICE O/P EST SF 10 MIN: CPT | Mod: ZF

## 2017-03-20 NOTE — MR AVS SNAPSHOT
After Visit Summary   3/20/2017    Susanna Gleason    MRN: [id number]           Patient Information     Date Of Birth          1985        Visit Information        Provider Department      3/20/2017 10:15 AM Daria Butts APRN CNM; [facility] [employer] [facility]        Today's Diagnoses     Routine postpartum follow-up    -  1    Postpartum care and examination of lactating mother        Birth control counseling        Well woman exam           Follow-ups after your visit        Follow-up notes from your care team     Discussed this visit Return in about 1 year (around 3/20/2018), or Desires Birth Control, for Well Woman Exam.      Who to contact     Please call your clinic at [phone] to:    Ask questions about your health    Make or cancel appointments    Discuss your medicines    Learn about your test results    Speak to your doctor   If you have compliments or concerns about an experience at your clinic, or if you wish to file a complaint, please contact [facility] Patient Relations at [phone] or email us at [email or web address]         Additional Information About Your Visit        MyChart Information     [employer] gives you secure access to your electronic health record. If you see a primary care provider, you can also send messages to your care team and make appointments. If you have questions, please call your primary care clinic.  If you do not have a primary care provider, please call [phone] and they will assist you.      [employer] is an electronic gateway that provides easy, online access to your medical records. With [employer], you can request a clinic appointment, read your test results, renew a prescription or communicate with your care team.     To access your existing account, please contact your [facility] or call [phone] for assistance.        Care EveryWhere ID      This is your Care EveryWhere ID. This could be used by other organizations to access your [place] medical records  [id number]        Your Vitals Were     Pulse Last Period BMI (Body Mass Index)             81 05/08/2016 (Exact Date) 22.34 kg/m2          Blood Pressure from Last 3 Encounters:   03/20/17 102/64   02/20/17 105/71   02/08/17 105/71    Weight from Last 3 Encounters:   03/20/17 58.1 kg (128 lb 1.6 oz)   02/20/17 61.1 kg (134 lb 12.8 oz)   02/06/17 69 kg (152 lb 1.9 oz)              Today, you had the following     No orders found for display       Primary Care Provider    Physician No Ref-Primary       No address on file        Thank you!     Thank you for choosing [facility]  for your care. Our goal is always to provide you with excellent care. Hearing back from our patients is one way we can continue to improve our services. Please take a few minutes to complete the written survey that you may receive in the mail after your visit with us. Thank you!             Your Updated Medication List - Protect others around you: Learn how to safely use, store and throw away your medicines at www.disposemymeds.org.          This list is accurate as of: 3/20/17  2:45 PM.  Always use your most recent med list.                   Brand Name Dispense Instructions for use    breast pump Misc     1 each    1 each daily as needed       prenatal multivitamin  plus iron 27-0.8 MG Tabs per tablet      Take 1 tablet by mouth daily

## 2017-03-20 NOTE — PROGRESS NOTES
Nursing Notes:   Tatiana Osman RN  3/20/2017 10:40 AM  Signed  Chief Complaint   Patient presents with     Postpartum Care     Postpartum visit, doing well just tired    SUBJECTIVE:   Susanna Gleason is here for her 6-week postpartum checkup.     PHQ-9 score: 4  Hx of Abuse:  No    Delivery Date: 2017.    Delivering provider:  Teresa Diaz CNM.    Type of delivery:  .  Perineum:  tear, with repair.     Delivery complications: None  Infant gender:  girl, weight 8 pounds 5 oz.  Feeding Method:  .  Complications reported with feeding:  none, infant thriving .    Bleeding:  Moderate.  Duration:  4 weeks.  Menses resumed:  No  Bowel/Urinary problems:  Yes     Contraception Planned:  Wants to talk about it has not decided  She  has not had intercourse since delivery..       Here with , FOB and infant daughter.   Pumping only if going out, otherwise breastfeeding exclusively.    Thinking about school next year.   Staying at home for a year, planning to breastfeed for the year.  Mother is still here helping.   No constipation, no diarrhea, no s/s of UTI.  Child spacing 2-3 years.  Condoms.  Unsure about LARCH but willing to discuss.  Periods 5-6 days, heavy flow some clots, +moderate cramping.    Mood stable, tired but otherwise coping well.     ================================================================  ROS: 10 point ROS neg other than the symptoms noted above in the HPI.       EXAM:  /64  Pulse 81  Wt 58.1 kg (128 lb 1.6 oz)  LMP 2016 (Exact Date)  BMI 22.34 kg/m2    General: healthy, alert, no distress, cooperative and smiling  Psych: negative for sleep disturbance, anxiety, nervous breakdown, depression, thoughts of self-harm, thoughts of hurting someone else, sexual difficulties, marital problems and abusive relationship  Last PHQ-9 score on record= 4  Breasts:  Lactating, Nipples intact with no lesions, Non-tender and No S/S of yeast or mastitis  Abdomen: Benign,  Soft, flat, non-tender, No masses, organomegaly and Diastasis less than 1-2 FB  Incision:  None   Vulva:  Normal genitalia and Bartholin's, Urethra, [place]'s normal  Vagina:  normal with good muscle tone with coached kegel, without discharge, rugated and epis/repair well healed  Cervix:  Multiparous,, no lesions and pink, moist, closed, without lesion or CMT.    Uterus:  fully involuted and non-tender    Adnexa:  Within normal limits and No masses, nodularity, tenderness  Recto-vaginal:   anus normal        ASSESSMENT:   Encounter Diagnoses   Name Primary?     Routine postpartum follow-up Yes     Postpartum care and examination of lactating mother      Birth control counseling       Normal postpartum exam after   Pregnancy was complicated by:  none.   Pap NIL 2016, HPV negative.      PLAN:   - Reviewed how to establish/maintain milk supply, nipple care, pumping for storage, fore/hind milk, wet/dirty diapers to expect each day, resources prn if questions or concerns.   - Reviewed risk/benefits of Nexplanon, Mirena, Paragard.  Given written information to review.  Pt will use condoms and then follow up prn if desires LARC.   - Pap with HPV cotesting due 5 years in .  - Discussed resumption of sexual activity - water based lubricant recommended;  topical estrogen prn.   - Risks and benefits of prescribed medications discussed.  Medication instructions reviewed.  - Signs and symptoms of postpartum depression/anxiety discussed and resources offered  - Discussed calcium intake, vitamins and supplements including Vitamin D  - Exercise encouraged  -High fiber, low fat diet encouraged  -Increase fluid intake  -Kegel exercises recommended  -Routine breast self-exam encouraged  -STI/STD prevention discussed    -Follow up in 1 year for well woman and prn     All pt's and FOB's questions discussed and answered. Pt and FOB verbalized understanding of and agreement to plan of care with  assistance.      Daria  Benjamín APRN, CNM

## 2017-03-20 NOTE — NURSING NOTE
Chief Complaint   Patient presents with     Postpartum Care     Postpartum visit, doing well just tired    SUBJECTIVE:   Susanna Gleason is here for her 6-week postpartum checkup.     PHQ-9 score: 4  Hx of Abuse:  No    Delivery Date: 2017.    Delivering provider:  Teresa Diaz CNM.    Type of delivery:  .  Perineum:  tear, with repair.     Delivery complications: None  Infant gender:  girl, weight 8 pounds 5 oz.  Feeding Method:  .  Complications reported with feeding:  none, infant thriving .    Bleeding:  Moderate.  Duration:  4 weeks.  Menses resumed:  No  Bowel/Urinary problems:  Yes     Contraception Planned:  Wants to talk about it has not decided  She  has not had intercourse since delivery..

## 2017-03-21 ASSESSMENT — PATIENT HEALTH QUESTIONNAIRE - PHQ9: SUM OF ALL RESPONSES TO PHQ QUESTIONS 1-9: 4

## 2017-04-21 ENCOUNTER — TELEPHONE (OUTPATIENT)
Dept: OBGYN | Facility: CLINIC | Age: 32
End: 2017-04-21

## 2017-04-21 NOTE — TELEPHONE ENCOUNTER
Received call from Susanna who states she started having brown discharge with some cramping about two days ago. She is not having severe pain, fever, or bright red bleeding. She has not had any bleeding since about 4 weeks postpartum and she delivered 2/6/17 so she is about 10 weeks pp.     Advised that this could be her period but that she should continue to monitor for bleeding, fever, or pain. Instructed her to call her if bleeding lasts longer than 5-7 days or she develops irregular bleeding (bleeding b/w periods), she has an increase in pain, or develops signs of infection as discussed. She understood plan and had no further questions.

## 2019-10-01 PROBLEM — Z78.9 USES BIRTH CONTROL: Status: ACTIVE | Noted: 2017-03-20

## 2020-03-10 ENCOUNTER — HEALTH MAINTENANCE LETTER (OUTPATIENT)
Age: 35
End: 2020-03-10

## 2020-07-20 NOTE — NURSING NOTE
Chief Complaint   Patient presents with     Prenatal Care   37.5 weeks ob visit feeling well.  
Impaired

## 2020-12-27 ENCOUNTER — HEALTH MAINTENANCE LETTER (OUTPATIENT)
Age: 35
End: 2020-12-27

## 2021-04-24 ENCOUNTER — HEALTH MAINTENANCE LETTER (OUTPATIENT)
Age: 36
End: 2021-04-24

## 2021-10-09 ENCOUNTER — HEALTH MAINTENANCE LETTER (OUTPATIENT)
Age: 36
End: 2021-10-09

## 2022-02-17 PROBLEM — O99.613 CONSTIPATION DURING PREGNANCY IN THIRD TRIMESTER: Status: RESOLVED | Noted: 2017-01-09 | Resolved: 2017-03-20

## 2022-02-17 PROBLEM — K59.00 CONSTIPATION DURING PREGNANCY IN THIRD TRIMESTER: Status: RESOLVED | Noted: 2017-01-09 | Resolved: 2017-03-20

## 2022-05-16 ENCOUNTER — HEALTH MAINTENANCE LETTER (OUTPATIENT)
Age: 37
End: 2022-05-16

## 2022-09-11 ENCOUNTER — HEALTH MAINTENANCE LETTER (OUTPATIENT)
Age: 37
End: 2022-09-11

## 2023-06-03 ENCOUNTER — HEALTH MAINTENANCE LETTER (OUTPATIENT)
Age: 38
End: 2023-06-03